# Patient Record
Sex: FEMALE | Employment: FULL TIME | URBAN - METROPOLITAN AREA
[De-identification: names, ages, dates, MRNs, and addresses within clinical notes are randomized per-mention and may not be internally consistent; named-entity substitution may affect disease eponyms.]

---

## 2020-11-01 ENCOUNTER — APPOINTMENT (OUTPATIENT)
Dept: LAB | Facility: CLINIC | Age: 35
End: 2020-11-01
Attending: OBSTETRICS & GYNECOLOGY

## 2020-11-14 ENCOUNTER — HOME INFUSION (PRE-WILLOW HOME INFUSION) (OUTPATIENT)
Dept: PHARMACY | Facility: CLINIC | Age: 35
End: 2020-11-14

## 2020-11-16 ENCOUNTER — HOME INFUSION (PRE-WILLOW HOME INFUSION) (OUTPATIENT)
Dept: PHARMACY | Facility: CLINIC | Age: 35
End: 2020-11-16

## 2020-11-16 NOTE — PROGRESS NOTES
This is a recent snapshot of the patient's [facility] medical record.  For current drug dose and complete information and questions, call [phone] or In Basket pool, fv home infusion ([id number])  CSN Number:  [id number]

## 2020-11-17 NOTE — PROGRESS NOTES
This is a recent snapshot of the patient's [facility] medical record.  For current drug dose and complete information and questions, call [phone] or In Basket pool, fv home infusion ([id number])  CSN Number:  [id number]

## 2020-11-21 ENCOUNTER — HOME INFUSION (PRE-WILLOW HOME INFUSION) (OUTPATIENT)
Dept: PHARMACY | Facility: CLINIC | Age: 35
End: 2020-11-21

## 2020-11-23 NOTE — PROGRESS NOTES
This is a recent snapshot of the patient's [facility] medical record.  For current drug dose and complete information and questions, call [phone] or In Basket pool, fv home infusion ([id number])  CSN Number:  [id number]

## 2020-11-25 ENCOUNTER — DOCUMENTATION ONLY (OUTPATIENT)
Dept: PHARMACY | Facility: CLINIC | Age: 35
End: 2020-11-25

## 2020-11-25 ENCOUNTER — HOME INFUSION (PRE-WILLOW HOME INFUSION) (OUTPATIENT)
Dept: PHARMACY | Facility: CLINIC | Age: 35
End: 2020-11-25

## 2020-11-25 NOTE — PROGRESS NOTES
Skilled Nurse visit in the [facility] Infusion Suite to administer Venofer 300mg.  No recent elevated temperature, fever, chills, productive cough, coughing for 3 weeks or longer or hemoptysis, abnormal vital signs, night sweats, chest pain. No decrease in your appetite, unexplained weight loss or fatigue.  No other new onset medical symptoms.  Current weight 248.6 pounds.  PIV placed left forearm, 1 attempt.  Infusion completed without complication or reaction. Pt reports therapy is effective in managing symptoms related to therapy.    Corazon Nix, RN

## 2020-11-30 NOTE — PROGRESS NOTES
This is a recent snapshot of the patient's [facility] medical record.  For current drug dose and complete information and questions, call [phone] or In Basket pool, fv home infusion ([id number])  CSN Number:  [id number]

## 2020-12-01 ENCOUNTER — APPOINTMENT (OUTPATIENT)
Dept: LAB | Facility: CLINIC | Age: 35
End: 2020-12-01
Attending: OBSTETRICS & GYNECOLOGY

## 2020-12-23 ENCOUNTER — HOME INFUSION (PRE-WILLOW HOME INFUSION) (OUTPATIENT)
Dept: PHARMACY | Facility: CLINIC | Age: 35
End: 2020-12-23

## 2020-12-28 NOTE — PROGRESS NOTES
This is a recent snapshot of the patient's [facility] medical record.  For current drug dose and complete information and questions, call [phone] or In Basket pool, fv home infusion ([id number])  CSN Number:  [id number]

## 2020-12-30 ENCOUNTER — DOCUMENTATION ONLY (OUTPATIENT)
Dept: PHARMACY | Facility: CLINIC | Age: 35
End: 2020-12-30

## 2020-12-30 ENCOUNTER — HOME INFUSION (PRE-WILLOW HOME INFUSION) (OUTPATIENT)
Dept: PHARMACY | Facility: CLINIC | Age: 35
End: 2020-12-30

## 2020-12-30 NOTE — PROGRESS NOTES
Skilled Nurse visit in the [facility] to administer Venofer.  No recent elevated temperature, fever, chills, productive cough, coughing for 3 weeks or longer or hemoptysis, abnormal vital signs, night sweats, chest pain. No decrease in appetite, unexplained weight loss or fatigue.  No other new onset medical symptoms.  Current weight 250.  PIV placed left ac, 1 attempt. Infusion completed without complication or reaction. Pt reports she is not sure that this is effective in managing symptoms related to therapy.    Jovan Zamora RN  [facility]  [email or web address]

## 2020-12-31 NOTE — PROGRESS NOTES
This is a recent snapshot of the patient's [facility] medical record.  For current drug dose and complete information and questions, call [phone] or In Basket pool, fv home infusion ([id number])  CSN Number:  [id number]

## 2021-01-01 ENCOUNTER — APPOINTMENT (OUTPATIENT)
Dept: LAB | Facility: CLINIC | Age: 36
End: 2021-01-01
Attending: OBSTETRICS & GYNECOLOGY

## 2021-01-04 ENCOUNTER — HOME INFUSION (PRE-WILLOW HOME INFUSION) (OUTPATIENT)
Dept: PHARMACY | Facility: CLINIC | Age: 36
End: 2021-01-04

## 2021-01-04 ENCOUNTER — DOCUMENTATION ONLY (OUTPATIENT)
Dept: PHARMACY | Facility: CLINIC | Age: 36
End: 2021-01-04

## 2021-01-04 NOTE — PROGRESS NOTES
Skilled Nurse visit in the [facility] Infusion Suite to administer Venofer 300 mg IV.  No recent elevated temperature, fever, chills, productive cough, coughing for 3 weeks or longer or hemoptysis, abnormal vital signs, night sweats, chest pain. No decrease in appetite, unexplained weight loss or fatigue.  No other new onset medical symptoms.  Current weight 250 lbs.  PIV placed left ac, 1 attempt. Infusion completed without complication or reaction. Pt reports she is not sure that this is effective in managing symptoms related to therapy.    MELVINA RodriguezN, RN  [phone]  [email or web address]

## 2021-01-05 NOTE — PROGRESS NOTES
This is a recent snapshot of the patient's [facility] medical record.  For current drug dose and complete information and questions, call [phone] or In Basket pool, fv home infusion ([id number])  CSN Number:  [id number]

## 2021-01-09 ENCOUNTER — HOME INFUSION (PRE-WILLOW HOME INFUSION) (OUTPATIENT)
Dept: PHARMACY | Facility: CLINIC | Age: 36
End: 2021-01-09

## 2021-01-11 NOTE — PROGRESS NOTES
This is a recent snapshot of the patient's [facility] medical record.  For current drug dose and complete information and questions, call [phone] or In Basket pool, fv home infusion ([id number])  CSN Number:  [id number]

## 2022-08-10 ENCOUNTER — TRANSCRIBE ORDERS (OUTPATIENT)
Dept: OTHER | Age: 37
End: 2022-08-10

## 2022-08-10 DIAGNOSIS — M25.562 ACUTE PAIN OF LEFT KNEE: ICD-10-CM

## 2022-08-10 DIAGNOSIS — M25.511 ACUTE PAIN OF RIGHT SHOULDER: Primary | ICD-10-CM

## 2024-01-25 ENCOUNTER — ANESTHESIA (OUTPATIENT)
Dept: SURGERY | Facility: CLINIC | Age: 39
DRG: 919 | End: 2024-01-25
Payer: COMMERCIAL

## 2024-01-25 ENCOUNTER — APPOINTMENT (OUTPATIENT)
Dept: ULTRASOUND IMAGING | Facility: CLINIC | Age: 39
DRG: 919 | End: 2024-01-25
Attending: EMERGENCY MEDICINE
Payer: COMMERCIAL

## 2024-01-25 ENCOUNTER — ANESTHESIA EVENT (OUTPATIENT)
Dept: SURGERY | Facility: CLINIC | Age: 39
DRG: 919 | End: 2024-01-25
Payer: COMMERCIAL

## 2024-01-25 ENCOUNTER — TRANSFERRED RECORDS (OUTPATIENT)
Dept: HEALTH INFORMATION MANAGEMENT | Facility: CLINIC | Age: 39
End: 2024-01-25

## 2024-01-25 ENCOUNTER — HOSPITAL ENCOUNTER (INPATIENT)
Facility: CLINIC | Age: 39
LOS: 2 days | Discharge: HOME | DRG: 919 | End: 2024-01-27
Attending: EMERGENCY MEDICINE | Admitting: OBSTETRICS & GYNECOLOGY
Payer: COMMERCIAL

## 2024-01-25 DIAGNOSIS — Z98.890 S/P D&C (STATUS POST DILATION AND CURETTAGE): Primary | ICD-10-CM

## 2024-01-25 DIAGNOSIS — K66.1 HEMOPERITONEUM: ICD-10-CM

## 2024-01-25 LAB
ABO/RH(D): NORMAL
ALBUMIN SERPL BCG-MCNC: 3.8 G/DL (ref 3.5–5.2)
ALP SERPL-CCNC: 56 U/L (ref 40–150)
ALT SERPL W P-5'-P-CCNC: 6 U/L (ref 0–50)
ANION GAP SERPL CALCULATED.3IONS-SCNC: 10 MMOL/L (ref 7–15)
ANTIBODY SCREEN: NEGATIVE
AST SERPL W P-5'-P-CCNC: 17 U/L (ref 0–45)
BASOPHILS # BLD AUTO: 0 10E3/UL (ref 0–0.2)
BASOPHILS NFR BLD AUTO: 0 %
BILIRUB SERPL-MCNC: 0.6 MG/DL
BLD PROD TYP BPU: NORMAL
BLD PROD TYP BPU: NORMAL
BLOOD COMPONENT TYPE: NORMAL
BLOOD COMPONENT TYPE: NORMAL
BUN SERPL-MCNC: 4 MG/DL (ref 6–20)
CALCIUM SERPL-MCNC: 9 MG/DL (ref 8.6–10)
CHLORIDE SERPL-SCNC: 104 MMOL/L (ref 98–107)
CODING SYSTEM: NORMAL
CODING SYSTEM: NORMAL
CREAT SERPL-MCNC: 0.59 MG/DL (ref 0.51–0.95)
CROSSMATCH: NORMAL
CROSSMATCH: NORMAL
DEPRECATED HCO3 PLAS-SCNC: 20 MMOL/L (ref 22–29)
EGFRCR SERPLBLD CKD-EPI 2021: >90 ML/MIN/1.73M2
EOSINOPHIL # BLD AUTO: 0.1 10E3/UL (ref 0–0.7)
EOSINOPHIL NFR BLD AUTO: 1 %
ERYTHROCYTE [DISTWIDTH] IN BLOOD BY AUTOMATED COUNT: 16.8 % (ref 10–15)
GLUCOSE SERPL-MCNC: 87 MG/DL (ref 70–99)
HCT VFR BLD AUTO: 26.2 % (ref 35–47)
HGB BLD-MCNC: 7.9 G/DL (ref 11.7–15.7)
HOLD SPECIMEN: NORMAL
HOLD SPECIMEN: NORMAL
IMM GRANULOCYTES # BLD: 0.1 10E3/UL
IMM GRANULOCYTES NFR BLD: 0 %
ISSUE DATE AND TIME: NORMAL
ISSUE DATE AND TIME: NORMAL
LYMPHOCYTES # BLD AUTO: 1.9 10E3/UL (ref 0.8–5.3)
LYMPHOCYTES NFR BLD AUTO: 15 %
MCH RBC QN AUTO: 23 PG (ref 26.5–33)
MCHC RBC AUTO-ENTMCNC: 30.2 G/DL (ref 31.5–36.5)
MCV RBC AUTO: 76 FL (ref 78–100)
MONOCYTES # BLD AUTO: 0.5 10E3/UL (ref 0–1.3)
MONOCYTES NFR BLD AUTO: 4 %
NEUTROPHILS # BLD AUTO: 10.5 10E3/UL (ref 1.6–8.3)
NEUTROPHILS NFR BLD AUTO: 80 %
NRBC # BLD AUTO: 0 10E3/UL
NRBC BLD AUTO-RTO: 0 /100
PLATELET # BLD AUTO: 256 10E3/UL (ref 150–450)
POTASSIUM SERPL-SCNC: 3.6 MMOL/L (ref 3.4–5.3)
PROT SERPL-MCNC: 7.3 G/DL (ref 6.4–8.3)
RBC # BLD AUTO: 3.44 10E6/UL (ref 3.8–5.2)
SODIUM SERPL-SCNC: 134 MMOL/L (ref 135–145)
SPECIMEN EXPIRATION DATE: NORMAL
UNIT ABO/RH: NORMAL
UNIT ABO/RH: NORMAL
UNIT NUMBER: NORMAL
UNIT NUMBER: NORMAL
UNIT STATUS: NORMAL
UNIT STATUS: NORMAL
UNIT TYPE ISBT: 7300
UNIT TYPE ISBT: 7300
WBC # BLD AUTO: 13 10E3/UL (ref 4–11)

## 2024-01-25 PROCEDURE — 250N000011 HC RX IP 250 OP 636: Performed by: NURSE ANESTHETIST, CERTIFIED REGISTERED

## 2024-01-25 PROCEDURE — 86923 COMPATIBILITY TEST ELECTRIC: CPT

## 2024-01-25 PROCEDURE — 80053 COMPREHEN METABOLIC PANEL: CPT | Performed by: EMERGENCY MEDICINE

## 2024-01-25 PROCEDURE — 250N000011 HC RX IP 250 OP 636

## 2024-01-25 PROCEDURE — 250N000024 HC ISOFLURANE, PER MIN: Performed by: OBSTETRICS & GYNECOLOGY

## 2024-01-25 PROCEDURE — 370N000017 HC ANESTHESIA TECHNICAL FEE, PER MIN: Performed by: OBSTETRICS & GYNECOLOGY

## 2024-01-25 PROCEDURE — 999N000141 HC STATISTIC PRE-PROCEDURE NURSING ASSESSMENT: Performed by: OBSTETRICS & GYNECOLOGY

## 2024-01-25 PROCEDURE — 99222 1ST HOSP IP/OBS MODERATE 55: CPT | Mod: 25 | Performed by: OBSTETRICS & GYNECOLOGY

## 2024-01-25 PROCEDURE — 99291 CRITICAL CARE FIRST HOUR: CPT | Performed by: EMERGENCY MEDICINE

## 2024-01-25 PROCEDURE — 250N000013 HC RX MED GY IP 250 OP 250 PS 637

## 2024-01-25 PROCEDURE — 0U9F4ZZ DRAINAGE OF CUL-DE-SAC, PERCUTANEOUS ENDOSCOPIC APPROACH: ICD-10-PCS | Performed by: OBSTETRICS & GYNECOLOGY

## 2024-01-25 PROCEDURE — 76856 US EXAM PELVIC COMPLETE: CPT

## 2024-01-25 PROCEDURE — 86900 BLOOD TYPING SEROLOGIC ABO: CPT | Performed by: EMERGENCY MEDICINE

## 2024-01-25 PROCEDURE — 250N000011 HC RX IP 250 OP 636: Performed by: EMERGENCY MEDICINE

## 2024-01-25 PROCEDURE — 710N000010 HC RECOVERY PHASE 1, LEVEL 2, PER MIN: Performed by: OBSTETRICS & GYNECOLOGY

## 2024-01-25 PROCEDURE — 85025 COMPLETE CBC W/AUTO DIFF WBC: CPT | Performed by: EMERGENCY MEDICINE

## 2024-01-25 PROCEDURE — 250N000009 HC RX 250: Performed by: NURSE ANESTHETIST, CERTIFIED REGISTERED

## 2024-01-25 PROCEDURE — 250N000011 HC RX IP 250 OP 636: Performed by: STUDENT IN AN ORGANIZED HEALTH CARE EDUCATION/TRAINING PROGRAM

## 2024-01-25 PROCEDURE — P9016 RBC LEUKOCYTES REDUCED: HCPCS

## 2024-01-25 PROCEDURE — 96376 TX/PRO/DX INJ SAME DRUG ADON: CPT | Performed by: EMERGENCY MEDICINE

## 2024-01-25 PROCEDURE — 258N000003 HC RX IP 258 OP 636: Performed by: NURSE ANESTHETIST, CERTIFIED REGISTERED

## 2024-01-25 PROCEDURE — 360N000076 HC SURGERY LEVEL 3, PER MIN: Performed by: OBSTETRICS & GYNECOLOGY

## 2024-01-25 PROCEDURE — 120N000002 HC R&B MED SURG/OB UMMC

## 2024-01-25 PROCEDURE — 49320 DIAG LAPARO SEPARATE PROC: CPT | Mod: GC | Performed by: OBSTETRICS & GYNECOLOGY

## 2024-01-25 PROCEDURE — 99285 EMERGENCY DEPT VISIT HI MDM: CPT | Mod: 25 | Performed by: EMERGENCY MEDICINE

## 2024-01-25 PROCEDURE — 36415 COLL VENOUS BLD VENIPUNCTURE: CPT | Performed by: EMERGENCY MEDICINE

## 2024-01-25 PROCEDURE — 96374 THER/PROPH/DIAG INJ IV PUSH: CPT | Performed by: EMERGENCY MEDICINE

## 2024-01-25 PROCEDURE — 250N000013 HC RX MED GY IP 250 OP 250 PS 637: Performed by: STUDENT IN AN ORGANIZED HEALTH CARE EDUCATION/TRAINING PROGRAM

## 2024-01-25 PROCEDURE — 272N000001 HC OR GENERAL SUPPLY STERILE: Performed by: OBSTETRICS & GYNECOLOGY

## 2024-01-25 RX ORDER — ONDANSETRON 2 MG/ML
4 INJECTION INTRAMUSCULAR; INTRAVENOUS EVERY 30 MIN PRN
Status: DISCONTINUED | OUTPATIENT
Start: 2024-01-25 | End: 2024-01-26 | Stop reason: HOSPADM

## 2024-01-25 RX ORDER — HYDROMORPHONE HYDROCHLORIDE 1 MG/ML
0.2 INJECTION, SOLUTION INTRAMUSCULAR; INTRAVENOUS; SUBCUTANEOUS EVERY 5 MIN PRN
Status: DISCONTINUED | OUTPATIENT
Start: 2024-01-25 | End: 2024-01-26 | Stop reason: HOSPADM

## 2024-01-25 RX ORDER — SODIUM CHLORIDE 9 MG/ML
INJECTION, SOLUTION INTRAVENOUS CONTINUOUS PRN
Status: DISCONTINUED | OUTPATIENT
Start: 2024-01-25 | End: 2024-01-25

## 2024-01-25 RX ORDER — SODIUM CHLORIDE, SODIUM LACTATE, POTASSIUM CHLORIDE, CALCIUM CHLORIDE 600; 310; 30; 20 MG/100ML; MG/100ML; MG/100ML; MG/100ML
INJECTION, SOLUTION INTRAVENOUS CONTINUOUS
Status: DISCONTINUED | OUTPATIENT
Start: 2024-01-25 | End: 2024-01-26 | Stop reason: HOSPADM

## 2024-01-25 RX ORDER — HALOPERIDOL 5 MG/ML
1 INJECTION INTRAMUSCULAR
Status: DISCONTINUED | OUTPATIENT
Start: 2024-01-25 | End: 2024-01-26 | Stop reason: HOSPADM

## 2024-01-25 RX ORDER — ACETAMINOPHEN 325 MG/1
975 TABLET ORAL ONCE
Status: COMPLETED | OUTPATIENT
Start: 2024-01-25 | End: 2024-01-25

## 2024-01-25 RX ORDER — ONDANSETRON 2 MG/ML
INJECTION INTRAMUSCULAR; INTRAVENOUS PRN
Status: DISCONTINUED | OUTPATIENT
Start: 2024-01-25 | End: 2024-01-25

## 2024-01-25 RX ORDER — PROPOFOL 10 MG/ML
INJECTION, EMULSION INTRAVENOUS PRN
Status: DISCONTINUED | OUTPATIENT
Start: 2024-01-25 | End: 2024-01-25

## 2024-01-25 RX ORDER — CEFAZOLIN SODIUM/WATER 2 G/20 ML
2 SYRINGE (ML) INTRAVENOUS
Status: COMPLETED | OUTPATIENT
Start: 2024-01-25 | End: 2024-01-25

## 2024-01-25 RX ORDER — OXYCODONE HYDROCHLORIDE 5 MG/1
5 TABLET ORAL ONCE
Status: COMPLETED | OUTPATIENT
Start: 2024-01-25 | End: 2024-01-25

## 2024-01-25 RX ORDER — DIMENHYDRINATE 50 MG/ML
25 INJECTION, SOLUTION INTRAMUSCULAR; INTRAVENOUS
Status: DISCONTINUED | OUTPATIENT
Start: 2024-01-25 | End: 2024-01-26 | Stop reason: HOSPADM

## 2024-01-25 RX ORDER — HYDROMORPHONE HYDROCHLORIDE 1 MG/ML
0.4 INJECTION, SOLUTION INTRAMUSCULAR; INTRAVENOUS; SUBCUTANEOUS EVERY 5 MIN PRN
Status: DISCONTINUED | OUTPATIENT
Start: 2024-01-25 | End: 2024-01-26 | Stop reason: HOSPADM

## 2024-01-25 RX ORDER — LIDOCAINE HYDROCHLORIDE 20 MG/ML
INJECTION, SOLUTION INFILTRATION; PERINEURAL PRN
Status: DISCONTINUED | OUTPATIENT
Start: 2024-01-25 | End: 2024-01-25

## 2024-01-25 RX ORDER — ACETAMINOPHEN 325 MG/1
650 TABLET ORAL EVERY 6 HOURS
Status: DISCONTINUED | OUTPATIENT
Start: 2024-01-26 | End: 2024-01-27 | Stop reason: HOSPADM

## 2024-01-25 RX ORDER — LABETALOL HYDROCHLORIDE 5 MG/ML
10 INJECTION, SOLUTION INTRAVENOUS
Status: DISCONTINUED | OUTPATIENT
Start: 2024-01-25 | End: 2024-01-26 | Stop reason: HOSPADM

## 2024-01-25 RX ORDER — ONDANSETRON 4 MG/1
4 TABLET, ORALLY DISINTEGRATING ORAL EVERY 30 MIN PRN
Status: DISCONTINUED | OUTPATIENT
Start: 2024-01-25 | End: 2024-01-26 | Stop reason: HOSPADM

## 2024-01-25 RX ORDER — CEFAZOLIN SODIUM/WATER 2 G/20 ML
2 SYRINGE (ML) INTRAVENOUS SEE ADMIN INSTRUCTIONS
Status: DISCONTINUED | OUTPATIENT
Start: 2024-01-25 | End: 2024-01-25 | Stop reason: HOSPADM

## 2024-01-25 RX ORDER — FENTANYL CITRATE 50 UG/ML
INJECTION, SOLUTION INTRAMUSCULAR; INTRAVENOUS PRN
Status: DISCONTINUED | OUTPATIENT
Start: 2024-01-25 | End: 2024-01-25

## 2024-01-25 RX ORDER — OXYCODONE HYDROCHLORIDE 5 MG/1
5 TABLET ORAL EVERY 4 HOURS PRN
Status: DISCONTINUED | OUTPATIENT
Start: 2024-01-25 | End: 2024-01-26

## 2024-01-25 RX ORDER — HYDROMORPHONE HYDROCHLORIDE 1 MG/ML
0.5 INJECTION, SOLUTION INTRAMUSCULAR; INTRAVENOUS; SUBCUTANEOUS EVERY 30 MIN PRN
Status: DISCONTINUED | OUTPATIENT
Start: 2024-01-25 | End: 2024-01-26

## 2024-01-25 RX ORDER — HYDROXYZINE HYDROCHLORIDE 25 MG/1
25 TABLET, FILM COATED ORAL EVERY 6 HOURS PRN
Status: DISCONTINUED | OUTPATIENT
Start: 2024-01-25 | End: 2024-01-25

## 2024-01-25 RX ORDER — SODIUM CHLORIDE, SODIUM LACTATE, POTASSIUM CHLORIDE, CALCIUM CHLORIDE 600; 310; 30; 20 MG/100ML; MG/100ML; MG/100ML; MG/100ML
INJECTION, SOLUTION INTRAVENOUS CONTINUOUS PRN
Status: DISCONTINUED | OUTPATIENT
Start: 2024-01-25 | End: 2024-01-25

## 2024-01-25 RX ORDER — FENTANYL CITRATE 50 UG/ML
50 INJECTION, SOLUTION INTRAMUSCULAR; INTRAVENOUS EVERY 5 MIN PRN
Status: DISCONTINUED | OUTPATIENT
Start: 2024-01-25 | End: 2024-01-26 | Stop reason: HOSPADM

## 2024-01-25 RX ORDER — HYDRALAZINE HYDROCHLORIDE 20 MG/ML
2.5-5 INJECTION INTRAMUSCULAR; INTRAVENOUS EVERY 10 MIN PRN
Status: DISCONTINUED | OUTPATIENT
Start: 2024-01-25 | End: 2024-01-26 | Stop reason: HOSPADM

## 2024-01-25 RX ORDER — KETAMINE HYDROCHLORIDE 10 MG/ML
INJECTION INTRAMUSCULAR; INTRAVENOUS PRN
Status: DISCONTINUED | OUTPATIENT
Start: 2024-01-25 | End: 2024-01-25

## 2024-01-25 RX ORDER — FENTANYL CITRATE 50 UG/ML
25 INJECTION, SOLUTION INTRAMUSCULAR; INTRAVENOUS EVERY 5 MIN PRN
Status: DISCONTINUED | OUTPATIENT
Start: 2024-01-25 | End: 2024-01-26 | Stop reason: HOSPADM

## 2024-01-25 RX ORDER — MEPERIDINE HYDROCHLORIDE 25 MG/ML
12.5 INJECTION INTRAMUSCULAR; INTRAVENOUS; SUBCUTANEOUS EVERY 5 MIN PRN
Status: DISCONTINUED | OUTPATIENT
Start: 2024-01-25 | End: 2024-01-26 | Stop reason: HOSPADM

## 2024-01-25 RX ADMIN — PHENYLEPHRINE HYDROCHLORIDE 100 MCG: 10 INJECTION INTRAVENOUS at 21:08

## 2024-01-25 RX ADMIN — HYDROMORPHONE HYDROCHLORIDE 0.5 MG: 1 INJECTION, SOLUTION INTRAMUSCULAR; INTRAVENOUS; SUBCUTANEOUS at 17:19

## 2024-01-25 RX ADMIN — LIDOCAINE HYDROCHLORIDE 100 MG: 20 INJECTION, SOLUTION INFILTRATION; PERINEURAL at 20:55

## 2024-01-25 RX ADMIN — SODIUM CHLORIDE, POTASSIUM CHLORIDE, SODIUM LACTATE AND CALCIUM CHLORIDE: 600; 310; 30; 20 INJECTION, SOLUTION INTRAVENOUS at 22:09

## 2024-01-25 RX ADMIN — HYDROMORPHONE HYDROCHLORIDE 0.5 MG: 1 INJECTION, SOLUTION INTRAMUSCULAR; INTRAVENOUS; SUBCUTANEOUS at 19:44

## 2024-01-25 RX ADMIN — SODIUM CHLORIDE, POTASSIUM CHLORIDE, SODIUM LACTATE AND CALCIUM CHLORIDE: 600; 310; 30; 20 INJECTION, SOLUTION INTRAVENOUS at 20:53

## 2024-01-25 RX ADMIN — SUCCINYLCHOLINE CHLORIDE 100 MG: 20 INJECTION, SOLUTION INTRAMUSCULAR; INTRAVENOUS; PARENTERAL at 20:56

## 2024-01-25 RX ADMIN — ONDANSETRON 4 MG: 2 INJECTION INTRAMUSCULAR; INTRAVENOUS at 22:23

## 2024-01-25 RX ADMIN — HYDROXYZINE HYDROCHLORIDE 25 MG: 25 TABLET, FILM COATED ORAL at 23:23

## 2024-01-25 RX ADMIN — FENTANYL CITRATE 50 MCG: 50 INJECTION INTRAMUSCULAR; INTRAVENOUS at 23:03

## 2024-01-25 RX ADMIN — Medication 30 MG: at 20:52

## 2024-01-25 RX ADMIN — SODIUM CHLORIDE: 9 INJECTION, SOLUTION INTRAVENOUS at 20:48

## 2024-01-25 RX ADMIN — Medication 2 G: at 20:56

## 2024-01-25 RX ADMIN — Medication 20 MG: at 21:17

## 2024-01-25 RX ADMIN — FENTANYL CITRATE 100 MCG: 50 INJECTION INTRAMUSCULAR; INTRAVENOUS at 20:55

## 2024-01-25 RX ADMIN — SUGAMMADEX 200 MG: 100 INJECTION, SOLUTION INTRAVENOUS at 22:30

## 2024-01-25 RX ADMIN — ACETAMINOPHEN 975 MG: 325 TABLET, FILM COATED ORAL at 19:52

## 2024-01-25 RX ADMIN — OXYCODONE HYDROCHLORIDE 5 MG: 5 TABLET ORAL at 23:23

## 2024-01-25 RX ADMIN — Medication 10 MG: at 22:23

## 2024-01-25 RX ADMIN — MIDAZOLAM 2 MG: 1 INJECTION INTRAMUSCULAR; INTRAVENOUS at 20:52

## 2024-01-25 RX ADMIN — PHENYLEPHRINE HYDROCHLORIDE 0.2 MCG/KG/MIN: 10 INJECTION INTRAVENOUS at 21:15

## 2024-01-25 RX ADMIN — PROPOFOL 200 MG: 10 INJECTION, EMULSION INTRAVENOUS at 20:55

## 2024-01-25 RX ADMIN — Medication 30 MG: at 21:34

## 2024-01-25 ASSESSMENT — ACTIVITIES OF DAILY LIVING (ADL)
ADLS_ACUITY_SCORE: 35

## 2024-01-25 ASSESSMENT — LIFESTYLE VARIABLES: TOBACCO_USE: 0

## 2024-01-25 NOTE — ED PROVIDER NOTES
[facility] ([facility])    24      ED PROVIDER NOTE   History     Chief Complaint   Patient presents with    Anemia     Patient had d & C today at 1145 and Hgb fell from 8.0 pre-procedure to 6.8 post    Vaginal Bleeding     Reports [facility] but are concerned for uterine perforation; MD could not locate a perforation on ultrasound post procedure; pt has not soaked a regular pad yet     HPI  Rosalinda Jay is a 38 year old female with prior history of anemia,  x 2 who presents with acute blood loss.  She underwent D&C today at 11:45 AM complicated by vaginal bleeding.  They were concerned for uterine perforation based on the bleeding, were not able to locate source of perforation on ultrasound.  Her preprocedure hemoglobin was 8 but had decreased to 6.8 postprocedure. She was sent here for further evaluation and OB/GYN consult.  Notes decreased bleeding here.  She is having worsening abdominal pain.  No other symptoms noted.      PAST MEDICAL HISTORY:   Past Medical History:   Diagnosis Date    Anemia        PAST SURGICAL HISTORY: History reviewed. No pertinent surgical history.    Past medical history, past surgical history, medications, and allergies were reviewed with the patient. Additional pertinent items: None    FAMILY HISTORY: History reviewed. No pertinent family history.    SOCIAL HISTORY:   Social History     Tobacco Use    Smoking status: Never    Smokeless tobacco: Never   Substance Use Topics    Alcohol use: Not Currently     Social history was reviewed with the patient. Additional pertinent items: None    Patient's Medications    No medications on file          Allergies   Allergen Reactions    Ciprofloxacin Hives     Pt started cipro and tramadol at the same time, unsure which medication she reacted to    Ketorolac Tromethamine Hives    Amoxicillin-Pot Clavulanate Nausea and Vomiting    Vancomycin Unknown     Seafood Itching and Rash     Sudden on set after eating shrimp    Shrimp Itching and Rash     Sudden on set after eating shrimp       A complete review of systems was performed with pertinent positives and negatives noted in the HPI, and all other systems negative.    Physical Exam   BP: 122/67  Pulse: 88  Temp: 98.2  F (36.8  C)  Resp: 16  SpO2: 99 %      Physical Exam  Vitals and nursing note reviewed.   Constitutional:       General: She is in acute distress.      Appearance: She is well-developed. She is not diaphoretic.   HENT:      Head: Normocephalic and atraumatic.      Mouth/Throat:      Pharynx: No oropharyngeal exudate.   Eyes:      General: No scleral icterus.        Right eye: No discharge.         Left eye: No discharge.      Pupils: Pupils are equal, round, and reactive to light.   Cardiovascular:      Rate and Rhythm: Normal rate and regular rhythm.      Heart sounds: Normal heart sounds. No murmur heard.     No friction rub. No gallop.   Pulmonary:      Effort: Pulmonary effort is normal. No respiratory distress.      Breath sounds: Normal breath sounds. No wheezing.   Chest:      Chest wall: No tenderness.   Abdominal:      General: Bowel sounds are normal. There is no distension.      Palpations: Abdomen is soft.      Tenderness: There is abdominal tenderness.   Musculoskeletal:         General: No tenderness or deformity. Normal range of motion.      Cervical back: Normal range of motion and neck supple.   Skin:     General: Skin is warm and dry.      Coloration: Skin is not pale.      Findings: No erythema or rash.   Neurological:      Mental Status: She is alert and oriented to person, place, and time.      Cranial Nerves: No cranial nerve deficit.         ED Course        Procedures                      Labs Ordered and Resulted from Time of ED Arrival to Time of ED Departure - No data to display         Critical Care time was 30 minutes for this patient excluding procedures.      Assessments  & Plan (with Medical Decision Making)   Is a 38-year-old female who presents with decreased hemoglobin and concern for uterine perforation.  She underwent D&C today and was sent to this facility due to drop in hemoglobin and concern for perforation.  On exam patient appears uncomfortable.  She has lower abdominal tenderness that has been increasing during her stay in the Emergency Department.  Work shows hemoglobin of 7.9 with WBC count of 13.  Ultrasound shows concern for blood within the pelvis.  I discussed case with OB/GYN who saw the patient.  They took patient to the OR.    I have reviewed the nursing notes.    I have reviewed the findings, diagnosis, plan and need for follow up with the patient.    New Prescriptions    No medications on file       Final diagnoses:   None     Rowdy Gutierrez DO    1/25/2024   [facility] EMERGENCY DEPARTMENT         Rowdy Gutierrez, DO  01/26/24 0050

## 2024-01-25 NOTE — CONSULTS
Gynecology Consultation Note      Patient Summary:  Rosalinda Jay is a 38 year old  who is POD#0 s/p sD&C at 6w sent to the ED from [facility] to be evaluated for persistent pain and chills after extended monitoring.    HPI: Ms. Jay had an presumed uncomplicated sD&C this morning, but stayed for prolonged monitoring in the setting of persistent pain after the procedure was complete. Her bleeding was within expected limits and there was no obvious etiology for her pain on ultrasound in the clinic so she was sent for further evaluation. On presentation to the ED she reported ongoing pain which was initially controlled with dilaudid. She went for pelvic US and was unable to tolerate vaginal probe during the US and on return to the room reported worsening pain.     On my evaluation after pt returned from pelvic US, she reports pain which has been worsening since her arrival. Started down low in her abdomen initially after the procedure but has migrated to her upper abdomen and radiates around to her flanks. Any type of movement on the bed is extremely uncomfortable. She feels lightheaded and dizzy even lying in bed at the moment. Also starting to feel nauseated but has not vomited. Last at some crackers and water after her procedure around noon. Denies chest pain or shortness of breath. Reports medical history is only significant for anemia. She is not taking any daily medications. Surgical history is notable for 3  deliveries.     ROS: Negative aside from above    OB History:   C/s x3, term deliveries, most recently in     PMH:   Past Medical History:   Diagnosis Date    Anemia        PSHx:    section x 3    Medications:   Current Facility-Administered Medications   Medication    ceFAZolin Sodium (ANCEF) injection 2 g    ceFAZolin Sodium (ANCEF) injection 2 g    [Auto Hold] HYDROmorphone (PF) (DILAUDID) injection 0.5 mg     No current facility-administered medications on file prior to  encounter.  No current outpatient medications on file prior to encounter.      Allergies:     Allergies   Allergen Reactions    Ciprofloxacin Hives     Pt started cipro and tramadol at the same time, unsure which medication she reacted to    Ketorolac Tromethamine Hives    Amoxicillin-Pot Clavulanate Nausea and Vomiting    Vancomycin Unknown    Ibuprofen GI Disturbance    Seafood Itching and Rash     Sudden on set after eating shrimp    Shrimp Itching and Rash     Sudden on set after eating shrimp       Social History:   Social History     Socioeconomic History    Marital status: Single     Spouse name: None    Number of children: None    Years of education: None    Highest education level: None   Tobacco Use    Smoking status: Never    Smokeless tobacco: Never   Substance and Sexual Activity    Alcohol use: Not Currently    Drug use: Never    Sexual activity: Yes       Physical Exam:   /60   Pulse 96   Temp 98.4  F (36.9  C)   Resp 16   SpO2 100%   Constitutional: Writhing in bed, appears uncomfortable, intermittently moaning   Cardiovascular: borderline tachycardic, well perfused   Respiratory: NWOB  Abdomen: soft, diffusely tender to palpation with guarding throughout, no rebound   Pelvic Exam - External genitalia normal well-estrogenized, healthy tissue. Normal pink vaginal mucosa.  - SSE: Normal cervix with minimal bleeding from external os. Old blood in vaginal vault, no evidence of cervical or vaginal laceration     Imaging + Labs: Reviewed. CBC notable for Hgb 7.9, was 8.6 prior to procedure. Otherwise wnl.    Pelvic US 1/25/2024:  IMPRESSION:  1.  Moderate volume of free pelvic fluid, some of which appears complex. Blood products cannot be excluded and correlation with CT angiography is recommended. This should include noncontrast, arterial, and venous phase imaging. Findings discussed   verbally via telephone with Dr. Gutierrez at 6:20 PM on 01/25/2024.  2.  No gross evidence of uterine perforation,  within limitation of the transabdominal only technique. If clinical suspicion persists, consider increased measures to control patient's pain and reattempt transvaginal imaging.  3.  Nonvisualization of both ovaries.      A&P: Rosalinda Jay is a 38 year old who is POD#0 from a sD&C for termination of pregnancy at 6w here to have her persistent pain evaluated. On arrival to the ED her pain initially improved with dilaudid but has subsequently worsened. Her vaginal bleeding is minimal, hemoglobin is stable however pelvic US shows a moderate amount of free fluid in the pelvis c/f possible blood product. Given worsening discomfort, exam with peritoneal signs, tachycardia, and imaging with free fluid which was not seen initially following her procedure, concern for possible uterine perforation or intraabdominal injury/bleeding. Recommended EUA, diagnostic laproscopy, possible exploratory laparotomy and pt agreed to proceed. Consented for blood transfusion in the case of ongoing bleeding. Verbally consented for hysterectomy in the setting of uncontrolled bleeding originating from the uterus or uterine vessels, possible involvement of other surgical teams in the setting of identified injury to another intraabdominal organ. Pt NPO since 1200. T&S, CBC previously collected.   - to OR for above stated procedure     Patient seen and discussed with Amor.    Clara Thomas MD  OB/GYN PGY-2  1/25/2024 7:47 PM     Staff MD Note    I appreciate the note by Dr. Thomas.  Any necessary changes have been made by me.  I saw and evaluated the patient and agree with the findings and plan of care as documented in the note.  Patient with concerns for free fluid in pelvic on Pelvic US and worsening pain following sD&C despite treatment with IV narcotics.  Patient with guarding throughout abdomen on palpation.  Discussed recommendation for diagnostic laparoscopy, possible exploratory laparotomy, possible uterine surgery.  Discussed risks of  bleeding, infection, damage to surrounding structures including bladder, bowels, ureters, uterus, ovaries, fallopian tubes, muscles, nerves and vessels in abdominal wall.  Given her anemia also discussed potential need for blood transfusion and she agrees to this if needed.  Consents signed to reflect above.  Plan to head to OR emergently.  Anesthesia and OR notified of plan.    Diana Chinchilla MD

## 2024-01-25 NOTE — ED NOTES
Bed: ED05  Expected date:   Expected time:   Means of arrival:   Comments:  Ambulance Vag bleed with low Hgb

## 2024-01-25 NOTE — ED TRIAGE NOTES
Patient received Tylenol and zofran at the clinic. EMS gave total of 100 mcg of fentanyl IV with last dose of 50 mcg given at 1550.

## 2024-01-25 NOTE — LETTER
[facility]  [address] [address]  Phone: [phone]  Fax: [phone]    January 27, 2024        Rosalinda Jay  [address]  [place] [address]          To whom it may concern:    RE: Rosalinda Jay    Patient may return to work on 2/12 with the following:  No restrictions    Please contact me for questions or concerns.      Sincerely,      Jovan Perez MD  Obstetrics & Gynecology, PGY-1  01/27/2024 10:33 AM

## 2024-01-26 LAB
ERYTHROCYTE [DISTWIDTH] IN BLOOD BY AUTOMATED COUNT: 16.8 % (ref 10–15)
HCG INTACT+B SERPL-ACNC: 8668 MIU/ML
HCT VFR BLD AUTO: 27.6 % (ref 35–47)
HGB BLD-MCNC: 8.3 G/DL (ref 11.7–15.7)
MCH RBC QN AUTO: 23.2 PG (ref 26.5–33)
MCHC RBC AUTO-ENTMCNC: 30.1 G/DL (ref 31.5–36.5)
MCV RBC AUTO: 77 FL (ref 78–100)
PLATELET # BLD AUTO: 251 10E3/UL (ref 150–450)
RBC # BLD AUTO: 3.58 10E6/UL (ref 3.8–5.2)
WBC # BLD AUTO: 7.6 10E3/UL (ref 4–11)

## 2024-01-26 PROCEDURE — 250N000011 HC RX IP 250 OP 636

## 2024-01-26 PROCEDURE — 120N000002 HC R&B MED SURG/OB UMMC

## 2024-01-26 PROCEDURE — 84702 CHORIONIC GONADOTROPIN TEST: CPT | Performed by: OBSTETRICS & GYNECOLOGY

## 2024-01-26 PROCEDURE — 85027 COMPLETE CBC AUTOMATED: CPT

## 2024-01-26 PROCEDURE — 258N000003 HC RX IP 258 OP 636

## 2024-01-26 PROCEDURE — 36415 COLL VENOUS BLD VENIPUNCTURE: CPT | Performed by: OBSTETRICS & GYNECOLOGY

## 2024-01-26 PROCEDURE — 250N000013 HC RX MED GY IP 250 OP 250 PS 637

## 2024-01-26 RX ORDER — PROCHLORPERAZINE MALEATE 10 MG
10 TABLET ORAL EVERY 6 HOURS PRN
Status: DISCONTINUED | OUTPATIENT
Start: 2024-01-26 | End: 2024-01-27 | Stop reason: HOSPADM

## 2024-01-26 RX ORDER — NALOXONE HYDROCHLORIDE 0.4 MG/ML
0.2 INJECTION, SOLUTION INTRAMUSCULAR; INTRAVENOUS; SUBCUTANEOUS
Status: DISCONTINUED | OUTPATIENT
Start: 2024-01-26 | End: 2024-01-27 | Stop reason: HOSPADM

## 2024-01-26 RX ORDER — HYDROMORPHONE HCL IN WATER/PF 6 MG/30 ML
.2-.4 PATIENT CONTROLLED ANALGESIA SYRINGE INTRAVENOUS
Status: DISCONTINUED | OUTPATIENT
Start: 2024-01-26 | End: 2024-01-26

## 2024-01-26 RX ORDER — PROCHLORPERAZINE 25 MG
25 SUPPOSITORY, RECTAL RECTAL EVERY 12 HOURS PRN
Status: DISCONTINUED | OUTPATIENT
Start: 2024-01-26 | End: 2024-01-27 | Stop reason: HOSPADM

## 2024-01-26 RX ORDER — POLYETHYLENE GLYCOL 3350 17 G/17G
17 POWDER, FOR SOLUTION ORAL 2 TIMES DAILY PRN
Status: DISCONTINUED | OUTPATIENT
Start: 2024-01-26 | End: 2024-01-27 | Stop reason: HOSPADM

## 2024-01-26 RX ORDER — NALOXONE HYDROCHLORIDE 0.4 MG/ML
0.4 INJECTION, SOLUTION INTRAMUSCULAR; INTRAVENOUS; SUBCUTANEOUS
Status: DISCONTINUED | OUTPATIENT
Start: 2024-01-26 | End: 2024-01-27 | Stop reason: HOSPADM

## 2024-01-26 RX ORDER — IBUPROFEN 600 MG/1
600 TABLET, FILM COATED ORAL EVERY 6 HOURS
Status: DISCONTINUED | OUTPATIENT
Start: 2024-01-26 | End: 2024-01-26

## 2024-01-26 RX ORDER — ONDANSETRON 2 MG/ML
4 INJECTION INTRAMUSCULAR; INTRAVENOUS EVERY 6 HOURS PRN
Status: DISCONTINUED | OUTPATIENT
Start: 2024-01-26 | End: 2024-01-27 | Stop reason: HOSPADM

## 2024-01-26 RX ORDER — CYCLOBENZAPRINE HCL 5 MG
10 TABLET ORAL 3 TIMES DAILY
Status: DISCONTINUED | OUTPATIENT
Start: 2024-01-26 | End: 2024-01-27 | Stop reason: HOSPADM

## 2024-01-26 RX ORDER — OXYCODONE HYDROCHLORIDE 5 MG/1
5-10 TABLET ORAL EVERY 4 HOURS PRN
Status: DISCONTINUED | OUTPATIENT
Start: 2024-01-26 | End: 2024-01-27 | Stop reason: HOSPADM

## 2024-01-26 RX ORDER — AMOXICILLIN 250 MG
2 CAPSULE ORAL 2 TIMES DAILY
Status: DISCONTINUED | OUTPATIENT
Start: 2024-01-26 | End: 2024-01-27 | Stop reason: HOSPADM

## 2024-01-26 RX ORDER — AMOXICILLIN 250 MG
1 CAPSULE ORAL 2 TIMES DAILY
Status: DISCONTINUED | OUTPATIENT
Start: 2024-01-26 | End: 2024-01-27 | Stop reason: HOSPADM

## 2024-01-26 RX ORDER — SODIUM CHLORIDE, SODIUM LACTATE, POTASSIUM CHLORIDE, CALCIUM CHLORIDE 600; 310; 30; 20 MG/100ML; MG/100ML; MG/100ML; MG/100ML
INJECTION, SOLUTION INTRAVENOUS CONTINUOUS
Status: DISCONTINUED | OUTPATIENT
Start: 2024-01-26 | End: 2024-01-26

## 2024-01-26 RX ORDER — IBUPROFEN 600 MG/1
600 TABLET, FILM COATED ORAL EVERY 6 HOURS PRN
Status: DISCONTINUED | OUTPATIENT
Start: 2024-01-26 | End: 2024-01-26

## 2024-01-26 RX ORDER — LIDOCAINE 40 MG/G
CREAM TOPICAL
Status: DISCONTINUED | OUTPATIENT
Start: 2024-01-26 | End: 2024-01-27 | Stop reason: HOSPADM

## 2024-01-26 RX ORDER — ONDANSETRON 4 MG/1
4 TABLET, ORALLY DISINTEGRATING ORAL EVERY 6 HOURS PRN
Status: DISCONTINUED | OUTPATIENT
Start: 2024-01-26 | End: 2024-01-27 | Stop reason: HOSPADM

## 2024-01-26 RX ADMIN — SODIUM CHLORIDE, POTASSIUM CHLORIDE, SODIUM LACTATE AND CALCIUM CHLORIDE: 600; 310; 30; 20 INJECTION, SOLUTION INTRAVENOUS at 06:12

## 2024-01-26 RX ADMIN — ACETAMINOPHEN 650 MG: 325 TABLET, FILM COATED ORAL at 11:51

## 2024-01-26 RX ADMIN — OXYCODONE HYDROCHLORIDE 10 MG: 5 TABLET ORAL at 06:09

## 2024-01-26 RX ADMIN — CYCLOBENZAPRINE HYDROCHLORIDE 10 MG: 5 TABLET, FILM COATED ORAL at 13:31

## 2024-01-26 RX ADMIN — CYCLOBENZAPRINE HYDROCHLORIDE 10 MG: 5 TABLET, FILM COATED ORAL at 20:38

## 2024-01-26 RX ADMIN — CYCLOBENZAPRINE HYDROCHLORIDE 10 MG: 5 TABLET, FILM COATED ORAL at 07:55

## 2024-01-26 RX ADMIN — SENNOSIDES AND DOCUSATE SODIUM 1 TABLET: 8.6; 5 TABLET ORAL at 07:55

## 2024-01-26 RX ADMIN — OXYCODONE HYDROCHLORIDE 5 MG: 5 TABLET ORAL at 20:38

## 2024-01-26 RX ADMIN — SODIUM CHLORIDE, POTASSIUM CHLORIDE, SODIUM LACTATE AND CALCIUM CHLORIDE: 600; 310; 30; 20 INJECTION, SOLUTION INTRAVENOUS at 01:32

## 2024-01-26 RX ADMIN — ACETAMINOPHEN 650 MG: 325 TABLET, FILM COATED ORAL at 01:32

## 2024-01-26 RX ADMIN — SENNOSIDES AND DOCUSATE SODIUM 1 TABLET: 8.6; 5 TABLET ORAL at 20:38

## 2024-01-26 RX ADMIN — CYCLOBENZAPRINE HYDROCHLORIDE 10 MG: 5 TABLET, FILM COATED ORAL at 02:55

## 2024-01-26 RX ADMIN — HYDROMORPHONE HYDROCHLORIDE 0.4 MG: 0.2 INJECTION, SOLUTION INTRAMUSCULAR; INTRAVENOUS; SUBCUTANEOUS at 02:54

## 2024-01-26 RX ADMIN — Medication 5 MG: at 01:32

## 2024-01-26 RX ADMIN — ACETAMINOPHEN 650 MG: 325 TABLET, FILM COATED ORAL at 06:10

## 2024-01-26 RX ADMIN — ACETAMINOPHEN 650 MG: 325 TABLET, FILM COATED ORAL at 18:32

## 2024-01-26 ASSESSMENT — ACTIVITIES OF DAILY LIVING (ADL)
ADLS_ACUITY_SCORE: 20
ADLS_ACUITY_SCORE: 35
ADLS_ACUITY_SCORE: 35
ADLS_ACUITY_SCORE: 20
ADLS_ACUITY_SCORE: 20
ADLS_ACUITY_SCORE: 18
ADLS_ACUITY_SCORE: 20
ADLS_ACUITY_SCORE: 18
ADLS_ACUITY_SCORE: 35
ADLS_ACUITY_SCORE: 20

## 2024-01-26 NOTE — PLAN OF CARE
Problem: Adult Inpatient Plan of Care  Goal: Absence of Hospital-Acquired Illness or Injury  Outcome: Progressing  Intervention: Identify and Manage Fall Risk  Recent Flowsheet Documentation  Taken 1/26/2024 0519 by Álvaro Chau RN  Safety Promotion/Fall Prevention: activity supervised  Intervention: Prevent Skin Injury  Recent Flowsheet Documentation  Taken 1/26/2024 0519 by Álvaro Chau RN  Body Position: supine, head elevated  Intervention: Prevent and Manage VTE (Venous Thromboembolism) Risk  Recent Flowsheet Documentation  Taken 1/26/2024 0519 by Álvaro Chau RN  VTE Prevention/Management: SCDs (sequential compression devices) on  Intervention: Prevent Infection  Recent Flowsheet Documentation  Taken 1/26/2024 0519 by Álvaro Chau RN  Infection Prevention: rest/sleep promoted  Goal: Optimal Comfort and Wellbeing  Outcome: Progressing  Intervention: Monitor Pain and Promote Comfort  Recent Flowsheet Documentation  Taken 1/26/2024 0610 by Álvaro Chau RN  Pain Management Interventions: medication (see MAR)  Taken 1/26/2024 0500 by Álvaro Chau RN  Pain Management Interventions: medication (see MAR)  Taken 1/26/2024 0333 by Álvaro Chau RN  Pain Management Interventions: medication (see MAR)  Taken 1/26/2024 0309 by Álvaro Chau RN  Pain Management Interventions: medication (see MAR)  Taken 1/26/2024 0217 by Álvaro Chau RN  Pain Management Interventions: medication (see MAR)  Goal: Readiness for Transition of Care  Outcome: Progressing  Flowsheets (Taken 1/26/2024 0715)  Anticipated Changes Related to Illness: none  Concerns to be Addressed: discharge planning  Intervention: Mutually Develop Transition Plan  Recent Flowsheet Documentation  Taken 1/26/2024 0715 by Álvaro Chau RN  Anticipated Changes Related to Illness: none  Concerns to be Addressed: discharge planning  Taken 1/26/2024 0517 by Álvaro Chau RN  Equipment Currently Used  at Home: none     Problem: Pain Acute  Goal: Optimal Pain Control and Function  Outcome: Progressing  Intervention: Develop Pain Management Plan  Recent Flowsheet Documentation  Taken 1/26/2024 0610 by Álvaro Chau RN  Pain Management Interventions: medication (see MAR)  Taken 1/26/2024 0500 by Álvaro Chau RN  Pain Management Interventions: medication (see MAR)  Taken 1/26/2024 0333 by Álvaro Chau RN  Pain Management Interventions: medication (see MAR)  Taken 1/26/2024 0309 by Álvaro Chau RN  Pain Management Interventions: medication (see MAR)  Taken 1/26/2024 0217 by Álvaro Chau RN  Pain Management Interventions: medication (see MAR)     Problem: Fall Injury Risk  Goal: Absence of Fall and Fall-Related Injury  Outcome: Met  Intervention: Promote Injury-Free Environment  Recent Flowsheet Documentation  Taken 1/26/2024 0519 by Álvaro Chau RN  Safety Promotion/Fall Prevention: activity supervised   Goal Outcome Evaluation:        New admit to the unit. Pt. Appeared to be disoriented due to the anaesthesia when she first arrived on the unit. Pt. Was disoriented to place and situation. Towards the end of the night, Pt. Appeared to be more orientated. Pt. Ax1/ SBA to BSC. Pt. Has some spotting, nothing abnormal. Possible discharge today  pending CBC. Continue POC

## 2024-01-26 NOTE — ANESTHESIA POSTPROCEDURE EVALUATION
Patient: Rosalinda Jay    Procedure: Procedure(s):  Exam Under Anesthesia, Laparoscopy diagnostic (gyn)       Anesthesia Type:  General    Note:  Disposition: Admission   Postop Pain Control: Uneventful            Sign Out: Well controlled pain   PONV: No   Neuro/Psych: Uneventful            Sign Out: Acceptable/Baseline neuro status   Airway/Respiratory: Uneventful            Sign Out: Acceptable/Baseline resp. status   CV/Hemodynamics: Uneventful            Sign Out: Acceptable CV status; No obvious hypovolemia; No obvious fluid overload   Other NRE: NONE   DID A NON-ROUTINE EVENT OCCUR? No           Last vitals:  Vitals Value Taken Time   /63 01/25/24 2315   Temp 37.1  C (98.8  F) 01/25/24 2245   Pulse 92 01/25/24 2323   Resp 19 01/25/24 2323   SpO2 99 % 01/25/24 2323   Vitals shown include unfiled device data.    Electronically Signed By: Janel Diaz MD  January 25, 2024  11:24 PM

## 2024-01-26 NOTE — PROGRESS NOTES
Gynecology Progress Note    Subjective:  Pt transferring from commode to bed. Endorses ongoing pain, but thinks it overall feels a little better than prior to surgery. Also endorses some upper abdominal irritation. Denies dizziness, nausea/vomiting. Voiding. Has light vaginal bleeding.     Objective:  Vitals:    01/25/24 2345 01/26/24 0008 01/26/24 0500 01/26/24 0610   BP:  108/53  106/58   BP Location:  Right arm  Left arm   Patient Position:  Semi-Nickerson's  Semi-Nickerson's   Cuff Size:  Adult Large  Adult Large   Pulse:    91   Resp:  16  17   Temp:  98.5  F (36.9  C)  98.4  F (36.9  C)   TempSrc:  Oral  Oral   SpO2: 97% 99% 98% 98%   Weight:  110.8 kg (244 lb 4.3 oz)     Height:         General: NAD, tired  CV: Regular rate  Pulm: Normal respiratory effort.  Abd: Soft, non-distended, appropriately tender. Incisions x3 with bandages in place, scant breakthrough drainage in umbilical incision, otherwise C/D/I  Ext: Trace edema    Labs:  Hgb 8.2 > EBL 5 > 7.9 > EBL 5 > 8.3    bHCG 8668    Assessment/Plan:  Rosalinda Jay is a 38 year old female who is POD#1 s/p EUA and diagnostic laparoscopy for abdominal pain after suction D&C for pregnancy termination. Surgery demonstrated small amount bloody fluid in pelvis but otherwise unremarkable without evidence of active bleeding or injury. bHCG obtained today as above, however no immediate pre-op value for comparison; will continue to trend to ensure resolving. Stable post-operatively, continues to work on pain control.    # Abdominal pain  # Post op  Routine post-operative goals: Encourage ambulation and spirometry.  FEN: regular diet  Pain: continue oral medications, plan to discontinue IV meds today  Heme: Hgb stable over last several checks  : Voiding  GI: Tolerating PO intake. Continue anti-emetics and stool softeners as needed.  PPX: SCDs while in bed, encourage ambulation  Dispo: Discharge to home likely POD#2 pending pain control    Sapna Bradford MD  ObGyn,  PGY-4  01/26/2024 7:21 AM

## 2024-01-26 NOTE — PROGRESS NOTES
ADMISSION to [facility] UNIT:    Rosalinda Jay was admitted from Post op  for  Hemoperitoneum .  2 RN skin assessment: completed by Aissatou SPENCE   Result of skin assessment and interventions/actions:  3 small surgical incisions covered with primapore  Height, weight: completed? UTC pt. Came up to unit very lethargic   Patient belongings & admission documents: see Flowsheets, completed? Pt. Belongings with her in room.   MDRO education added to care plan: yes

## 2024-01-26 NOTE — ANESTHESIA PROCEDURE NOTES
Airway       Patient location during procedure: OR       Procedure Start/Stop Times: 1/25/2024 8:57 PM  Staff -        Anesthesiologist:  Janel Rice MD       CRNA: Mckenna Morse APRN CRNA       Performed By: CRNA  Consent for Airway        Urgency: elective  Indications and Patient Condition       Indications for airway management: el-procedural       Induction type:intravenous       Mask difficulty assessment: 1 - vent by mask    Final Airway Details       Final airway type: endotracheal airway       Successful airway: ETT - single and Oral  Endotracheal Airway Details        ETT size (mm): 7.0       Cuffed: yes       Successful intubation technique: video laryngoscopy       VL Blade Size: MAC 3       Grade View of Cords: 1       Adjucts: stylet       Position: Right       Measured from: gums/teeth       Secured at (cm): 21       Bite block used: None    Post intubation assessment        Placement verified by: capnometry, equal breath sounds and chest rise        Number of attempts at approach: 1       Secured with: tape       Ease of procedure: easy       Dentition: Intact and Unchanged    Medication(s) Administered   Medication Administration Time: 1/25/2024 8:57 PM

## 2024-01-26 NOTE — OP NOTE
[facility]  Gynecology Operative Note     Name: Rosalinda Jay  MRN: [id number]  : 1985     Date of Surgery: 24     Pre-operative Diagnosis:   - Abdominal pain POD#0 suction D&C  - Moderate volume of free pelvic fluid     Post-operative Diagnosis:   - Same s/p below stated procedure      Procedure: Exam under anesthesia, Diagnostic laparoscopy    Surgeon: Dr. Diana Chinchilla MD  Assistants: Ernestina Bolton MD PGY-3 & Kaleigh Thomas MD PGY-2    Anesthesia: GETA    EBL: 5 mL   IVF: 1500 mL crystalloid  UOP: 375 mL clear yellow urine     Complications: None apparent     Indications:  Rosalinda Jay is a 38 year old who is POD#0 from a sD&C for termination of pregnancy at 6w here to have her persistent pain evaluated. On arrival to the ED her pain initially improved with dilaudid but has subsequently worsened. Her vaginal bleeding is minimal, hemoglobin is stable however pelvic US shows a moderate amount of free fluid in the pelvis c/f possible blood product. Given worsening discomfort, exam with peritoneal signs, tachycardia, and imaging with free fluid which was not seen initially following her procedure, concern for possible uterine perforation or intraabdominal injury/bleeding. Recommended EUA, diagnostic laproscopy, possible exploratory laparotomy and pt agreed to proceed. Consented for blood transfusion in the case of ongoing bleeding. Verbally consented for hysterectomy in the setting of uncontrolled bleeding originating from the uterus or uterine vessels, possible involvement of other surgical teams in the setting of identified injury to another intraabdominal organ      Findings: Normal external female genitalia. Normal vaginal mucosa and cervix without evidence of cervical or vaginal laceration. No evidence of intraabdominal injury on laparoscopic entry. No evidence of bleeding from entry site or lower quadrant port sites. Normal upper abdominal survey. Enlarged, globular  uterus without evidence of perforation. Normal appearing fallopian tubes and ovaries bilaterally without evidence of active bleeding (possible corpus luteum within right ovary, hemostatic in appearance). Bladder adherent to lower uterine segment. Adhesions noted from left lateral uterus to left pelvic sidewall. Small amount (100 ml) of bloody free fluid in the pelvis without evidence of ongoing collection. Normal appendix adherent to right abdominal sidewall.      Specimen: None     Complications: None apparent     Procedure Details:   The patient was taken to the operating room where SCDs were placed for DVT prophylaxis. General anesthesia was administered. The patient was placed in the dorsal lithotomy position with her feet in Yellowfin stirrups and arms tucked at her sides. She was prepped and draped in the usual sterile fashion. A surgical time out was performed.     A myers catheter was placed in the bladder. A speculum was placed in the vagina to visualize there cervix. The anterior lip of the cervix was grasped with a single-toothed tenaculum, and an acorn uterine manipulator was placed.      Attention was turned to the abdomen. A 5 mm vertical incision within the umbilicus. The umbilicus was then elevated and the Veress needle was placed through the umbilical incision to establish pneumoperitoneum. A saline drop test suggested intraperitoneal placement. CO2 gas was attached and opening pressure was less than 5 mmHg. The abdomen was insufflated to 15 mmHg. The Veress needle was removed and a 5 mm bariatric Visiport was placed under direct visualization.    Survey of the abdomen and pelvis revealed no injury on entry and the findings noted above. The patient was then placed in Trendelenburg position. Left and right lower quadrant 5 mm bariatric ports were placed, taking care to avoid the inferior epigastric vasculature bilaterally.  An atraumatic grasper was used to antevert the uterus and elevate the  bilateral adnexa, facilitating a thorough pelvic survey with above noted findings that were ultimately not concerning for perforation. Approximately 100 ml of bloody free fluid was evacuated from the posterior cul-de-sac using the suction-, without evidence of ongoing collection.     The pneumoperitoneum was expelled and the laparoscopic ports removed. The skin incisions were closed using 4-0 Monocryl. The tenaculum and acorn uterine manipulator were removed uneventfully and the cervix appeared hemostatic on final inspection. The myers catheter was removed.      All sponge, instrument, and needle counts were correct times two. Dr. Chinchilla was scrubbed and present for the procedure.The patient was extubated in the operating room and transferred to the PACU in stable condition.    Ernestina Bolton MD, PGY-3  12:05 AM  [facility]    Staff MD Note  I was present and scrubbed for the entire procedure noted above.  I agree with the description above and any necessary changes have been made by me.  Diana Chinchilla MD

## 2024-01-26 NOTE — PHARMACY-ADMISSION MEDICATION HISTORY
Pharmacist Admission Medication History    Admission medication history is complete. The information provided in this note is only as accurate as the sources available at the time of the update.    Information Source(s): Patient via in-person    Pertinent Information: None     Changes made to PTA medication list:  Added: None  Deleted: None  Changed: None    Medication Affordability:       Allergies reviewed with patient and updates made in EHR: yes    Medication History Completed By: JENNIFER TURNER RPH 1/26/2024 2:44 PM    No outpatient medications have been marked as taking for the 1/25/24 encounter (Hospital Encounter).

## 2024-01-26 NOTE — OR NURSING
"PACU to Inpatient Nursing Handoff    Patient Rosalinda Jay is a 38 year old female who speaks English.   Procedure Procedure(s):  Exam Under Anesthesia, Laparoscopy diagnostic (gyn)   Surgeon(s) Primary: Diana Chinchilla MD  Resident - Assisting: Clara Thomas MD; Ernestina Bolton MD     Allergies   Allergen Reactions    Ciprofloxacin Hives     Pt started cipro and tramadol at the same time, unsure which medication she reacted to    Ketorolac Tromethamine Hives    Amoxicillin-Pot Clavulanate Nausea and Vomiting    Vancomycin Unknown    Ibuprofen GI Disturbance    Seafood Itching and Rash     Sudden on set after eating shrimp    Shrimp Itching and Rash     Sudden on set after eating shrimp       Isolation  No active isolations     Past Medical History   has a past medical history of Anemia.    Anesthesia General   Dermatome Level     Preop Meds acetaminophen (Tylenol) - time given: 1952   Nerve block Not applicable   Intraop Meds fentanyl (Sublimaze): 100 mcg total  ketamine (Ketalar): 30 mg given  ondansetron (Zofran): last given at 2223   Local Meds Yes   Antibiotics cefazolin (Ancef) - last given at 2056     Pain Patient Currently in Pain: yes   PACU meds  hydroxizine (Vistaril/Atarax): 25 mg (total dose) last given at 2325   oxycodone (Roxicodone): 5 mg (total dose) last given at 2325    PCA / epidural No   Capnography     Telemetry     Inpatient Telemetry Monitor Ordered? No        Labs Glucose Lab Results   Component Value Date    GLC 87 01/25/2024       Hgb Lab Results   Component Value Date    HGB 7.9 01/25/2024       INR No results found for: \"INR\"   PACU Imaging Not applicable     Wound/Incision Incision/Surgical Site 01/25/24 Umbilicus (Active)   Number of days: 0       Incision/Surgical Site 01/25/24 Left Abdomen (Active)   Number of days: 0       Incision/Surgical Site 01/25/24 Right Abdomen (Active)   Number of days: 0      CMS        Equipment Not applicable   Other LDA ETT Cuffed 7 mm (Active)   Number " of days: 0        IV Access Peripheral IV Left Antecubital fossa (Active)   Site Assessment WDL 01/25/24 1645   Line Status Saline locked 01/25/24 1645   Dressing Status clean;dry;intact 01/25/24 1645   Line Intervention Flushed;Lab drawn 01/25/24 1645   Phlebitis Scale 0-->no symptoms 01/25/24 1645   Infiltration? no 01/25/24 1645   Number of days:        Peripheral IV 01/25/24 Right Wrist (Active)   Number of days: 0      Blood Products Not applicable EBL 10 mL   Intake/Output Date 01/25/24 0700 - 01/26/24 0659   Shift 6478-8183 4359-1269 9829-0950 24 Hour Total   INTAKE   I.V.  1400  1400   Shift Total(mL/kg)  1400(13.13)  1400(13.13)   OUTPUT   Shift Total(mL/kg)       Weight (kg)  106.59 106.59 106.59      Drains / [place] Urethral Catheter 01/25/24 Double-lumen 16 fr (Active)   Number of days: 0      Time of void PreOp Time of Void Prior to Procedure: 1930 (01/25/24 2010)    PostOp      Diapered? No   Bladder Scan     PO    crackers, water, and apple juice     Vitals    B/P: 117/60  T: 98.4  F (36.9  C)    Temp src: Oral  P:  Pulse: 96 (01/25/24 1700)          R: 16  O2:  SpO2: 100 %                   Family/support present No one here at this time, updated cousin, Ann by phone   Patient belongings     Patient transported on cart   DC meds/scripts (obs/outpt) Not applicable   Inpatient Pain Meds Released? Yes       Special needs/considerations None   Tasks needing completion None       Lilia Schulz, RN  [facility], or [phone]

## 2024-01-26 NOTE — ANESTHESIA CARE TRANSFER NOTE
Patient: Rosalinda Jay    Procedure: Procedure(s):  Exam Under Anesthesia, Laparoscopy diagnostic (gyn)       Diagnosis: Uterine perforation [S37.69XA]  Diagnosis Additional Information: No value filed.    Anesthesia Type:   General     Note:    Oropharynx: oropharynx clear of all foreign objects and spontaneously breathing  Level of Consciousness: drowsy  Oxygen Supplementation: face mask    Independent Airway: airway patency satisfactory and stable  Dentition: dentition unchanged  Vital Signs Stable: post-procedure vital signs reviewed and stable    Patient transferred to: PACU    Handoff Report: Identifed the Patient, Identified the Reponsible Provider, Reviewed the pertinent medical history, Discussed the surgical course, Reviewed Intra-OP anesthesia mangement and issues during anesthesia, Set expectations for post-procedure period and Allowed opportunity for questions and acknowledgement of understanding      Vitals:  Vitals Value Taken Time   /63 01/25/24 2315   Temp 37.1  C (98.8  F) 01/25/24 2245   Pulse 93 01/25/24 2322   Resp 15 01/25/24 2322   SpO2 98 % 01/25/24 2322   Vitals shown include unfiled device data.    Electronically Signed By: Janel Diaz MD  January 25, 2024  11:24 PM

## 2024-01-26 NOTE — ANESTHESIA PREPROCEDURE EVALUATION
Anesthesia Pre-Procedure Evaluation    Patient: Rosalinda Jay   MRN: [id number] : 1985        Procedure : Procedure(s):  Laparoscopy diagnostic (gyn)  Laparotomy exploratory, Possible Uterine Sugery          Past Medical History:   Diagnosis Date    Anemia       History reviewed. No pertinent surgical history.   Allergies   Allergen Reactions    Ciprofloxacin Hives     Pt started cipro and tramadol at the same time, unsure which medication she reacted to    Ketorolac Tromethamine Hives    Amoxicillin-Pot Clavulanate Nausea and Vomiting    Vancomycin Unknown    Ibuprofen GI Disturbance    Seafood Itching and Rash     Sudden on set after eating shrimp    Shrimp Itching and Rash     Sudden on set after eating shrimp      Social History     Tobacco Use    Smoking status: Never    Smokeless tobacco: Never   Substance Use Topics    Alcohol use: Not Currently      Wt Readings from Last 1 Encounters:   24 106.6 kg (235 lb)        Anesthesia Evaluation   Pt has had prior anesthetic.         ROS/MED HX  ENT/Pulmonary:  - neg pulmonary ROS  (-) tobacco use and asthma   Neurologic:  - neg neurologic ROS     Cardiovascular:       METS/Exercise Tolerance:     Hematologic:     (+)      anemia,          Musculoskeletal: Comment: Low back pain without sciatica      GI/Hepatic:       Renal/Genitourinary:  - neg Renal ROS     Endo:     (+)               Obesity,       Psychiatric/Substance Use:       Infectious Disease:  - neg infectious disease ROS     Malignancy:       Other: Comment: S/P D&C for pregnancy termination at ~5wks.   -- complicated with symptomatic hypotension and severe abdominal pain, concerning for possible uterine perforation  -- Here for diagnostic laparoscopy  -- Hgb 7.9 on admission   Prior pregnancies x3, all delivered via  under spinal. Uncomplicated           Physical Exam    Airway  airway exam normal      Mallampati: II   TM distance: > 3 FB   Neck ROM: full   Mouth opening: > 3  "cm    Respiratory Devices and Support         Dental       (+) Modest Abnormalities - crowns, retainers, 1 or 2 missing teeth      Cardiovascular   cardiovascular exam normal          Pulmonary   pulmonary exam normal                OUTSIDE LABS:  CBC:   Lab Results   Component Value Date    WBC 13.0 (H) 01/25/2024    HGB 7.9 (L) 01/25/2024    HCT 26.2 (L) 01/25/2024     01/25/2024     BMP:   Lab Results   Component Value Date     (L) 01/25/2024    POTASSIUM 3.6 01/25/2024    CHLORIDE 104 01/25/2024    CO2 20 (L) 01/25/2024    BUN 4.0 (L) 01/25/2024    CR 0.59 01/25/2024    GLC 87 01/25/2024     COAGS: No results found for: \"PTT\", \"INR\", \"FIBR\"  POC:   Lab Results   Component Value Date    HCG Negative 06/18/2003     HEPATIC:   Lab Results   Component Value Date    ALBUMIN 3.8 01/25/2024    PROTTOTAL 7.3 01/25/2024    ALT 6 01/25/2024    AST 17 01/25/2024    ALKPHOS 56 01/25/2024    BILITOTAL 0.6 01/25/2024     OTHER:   Lab Results   Component Value Date    ISA 9.0 01/25/2024       Anesthesia Plan    ASA Status:  3, emergent    NPO Status:  ELEVATED Aspiration Risk/Unknown    Anesthesia Type: General.     - Airway: ETT   Induction: Intravenous, RSI.   Maintenance: Balanced.   Techniques and Equipment:     - Airway: Video-Laryngoscope     - Lines/Monitors: 2nd IV     - Blood: Blood in Room, PRBC, FFP, T&S, T&C     - Drips/Meds: Phenylephrine     Consents    Anesthesia Plan(s) and associated risks, benefits, and realistic alternatives discussed. Questions answered and patient/representative(s) expressed understanding.     - Discussed:     - Discussed with:  Patient      - Extended Intubation/Ventilatory Support Discussed: Yes.      - Patient is DNR/DNI Status: No     Use of blood products discussed: Yes.     - Discussed with: Patient.     - Consented: consented to blood products     Postoperative Care    Pain management: Oral pain medications, IV analgesics.   PONV prophylaxis: Ondansetron (or other " "5HT-3), Dexamethasone or Solumedrol     Comments:    Other Comments: Emergent case for likely hemoperitoneum inpatient who had a D&C at other institution./ Suspected perforated uterus.   Starting Hgb 7.9. Will have blood available in the OR  Additional considerations as stated above.   MD Janel Horan MD    I have reviewed the pertinent notes and labs in the chart from the past 30 days and (re)examined the patient.  Any updates or changes from those notes are reflected in this note.     # Hyponatremia: Lowest Na = 134 mmol/L in last 30 days, will monitor as appropriate          # Obesity: Estimated body mass index is 39.11 kg/m  as calculated from the following:    Height as of this encounter: 1.651 m (5' 5\").    Weight as of this encounter: 106.6 kg (235 lb).      "

## 2024-01-26 NOTE — BRIEF OP NOTE
[facility]    Brief Operative Note    Pre-operative diagnosis: Abdominal pain s/p dilation and curettage     Intraabdominal free fluid c/f possible uterine perforation   Post-operative diagnosis Same as pre-operative diagnosis    Procedure: Exam Under Anesthesia, Laparoscopy diagnostic (gyn), N/A - Abdomen    Surgeon: Surgeon(s) and Role:     * Diana Chinchilla MD - Primary     * Clara Thomas MD - Resident - Assisting     * Ernestina Bolton MD - Resident - Assisting  Anesthesia: General   Estimated Blood Loss: 5 mL from 1/25/2024  8:48 PM to 1/25/2024 10:39 PM  IVF: 1500 ml crystalloid   UOP: 375 ml clear yellow urine       Drains: None  Specimens: * No specimens in log *  Complications: None     Findings: Normal external female genitalia. Normal vaginal mucosa and cervix without evidence of cervical or vaginal laceration. No evidence of intraabdominal injury on laparoscopic entry. No evidence of bleeding from entry site or lower quadrant port sites. Normal upper abdominal survey. Enlarged, globular uterus without evidence of perforation. Normal appearing fallopian tubes and ovaries bilaterally without evidence of active bleeding. Bladder adherent to lower uterine segment. Adhesions noted from left lateral uterus to left pelvic sidewall. Small amount (100 ml) of bloody free fluid in the pelvis without evidence of ongoing collection. Normal appendix adherent to right abdominal sidewall.     Full operative report to follow.     Clara Thomas MD  OB/GYN PGY-2  1/25/2024 10:46 PM

## 2024-01-27 VITALS
DIASTOLIC BLOOD PRESSURE: 46 MMHG | TEMPERATURE: 99.6 F | SYSTOLIC BLOOD PRESSURE: 111 MMHG | HEART RATE: 76 BPM | OXYGEN SATURATION: 98 % | HEIGHT: 65 IN | BODY MASS INDEX: 40.7 KG/M2 | RESPIRATION RATE: 18 BRPM | WEIGHT: 244.27 LBS

## 2024-01-27 PROCEDURE — 250N000013 HC RX MED GY IP 250 OP 250 PS 637

## 2024-01-27 RX ORDER — CYCLOBENZAPRINE HCL 10 MG
10 TABLET ORAL 3 TIMES DAILY PRN
Qty: 30 TABLET | Refills: 1 | Status: SHIPPED | OUTPATIENT
Start: 2024-01-27

## 2024-01-27 RX ORDER — OXYCODONE HYDROCHLORIDE 5 MG/1
5 TABLET ORAL EVERY 6 HOURS PRN
Qty: 12 TABLET | Refills: 0 | Status: SHIPPED | OUTPATIENT
Start: 2024-01-27 | End: 2024-01-30

## 2024-01-27 RX ORDER — ACETAMINOPHEN 325 MG/1
650 TABLET ORAL EVERY 6 HOURS
Qty: 120 TABLET | Refills: 0 | Status: SHIPPED | OUTPATIENT
Start: 2024-01-27

## 2024-01-27 RX ORDER — POLYETHYLENE GLYCOL 3350 17 G/17G
17 POWDER, FOR SOLUTION ORAL DAILY
Qty: 510 G | Refills: 0 | Status: SHIPPED | OUTPATIENT
Start: 2024-01-27

## 2024-01-27 RX ADMIN — DOCUSATE SODIUM 50 MG AND SENNOSIDES 8.6 MG 2 TABLET: 8.6; 5 TABLET, FILM COATED ORAL at 09:08

## 2024-01-27 RX ADMIN — ACETAMINOPHEN 650 MG: 325 TABLET, FILM COATED ORAL at 06:22

## 2024-01-27 RX ADMIN — CYCLOBENZAPRINE HYDROCHLORIDE 10 MG: 5 TABLET, FILM COATED ORAL at 09:08

## 2024-01-27 RX ADMIN — ACETAMINOPHEN 650 MG: 325 TABLET, FILM COATED ORAL at 00:15

## 2024-01-27 RX ADMIN — OXYCODONE HYDROCHLORIDE 5 MG: 5 TABLET ORAL at 09:08

## 2024-01-27 RX ADMIN — ACETAMINOPHEN 650 MG: 325 TABLET, FILM COATED ORAL at 12:04

## 2024-01-27 ASSESSMENT — ACTIVITIES OF DAILY LIVING (ADL)
ADLS_ACUITY_SCORE: 20
ADLS_ACUITY_SCORE: 25
ADLS_ACUITY_SCORE: 25

## 2024-01-27 NOTE — PROGRESS NOTES
Pt. discharged at 12:42 pm to home, and left with personal belongings. Pt. received complete discharge paperwork and discharge medications as filled by discharge pharmacy. Pt received and signed for the narcotic medication oxycodone. Pt. was given times of last dose for all discharge medications in writing on discharge medication sheets. Discharge teaching included oxycodone and constipation medication, pain management, activity restrictions, dressing changes, and signs and symptoms of infection.  Pt. to follow up with Dr. Gomez in the next week. Pt. had no further questions at the time of discharge and no unmet needs were identified.     Temp: 99.6  F (37.6  C) Temp src: Oral BP: 111/46 Pulse: 76   Resp: 18 SpO2: 98 % O2 Device: None (Room air)

## 2024-01-27 NOTE — DISCHARGE SUMMARY
Gynecology Discharge Summary    Rosalinda Jay    YOB: 1985  MRN# [id number]   Age: 38 year old         Date of Admission:  1/25/2024  Date of Discharge:  1/27/2024  Admitting Physician:  Diana Chinchilla MD  Discharge Physician:  Giselle Chapin MD  Discharging Service:  Gynecology     Primary Provider: Maci Willson          Admission Diagnoses:   S/p suction D&C at outside site  Vaginal bleeding  Abdominal pain  Free pelvic fluid          Discharge Diagnosis:     Same             Discharge Disposition:     Discharged to home in stable condition           Procedures:   Exam under anesthesia, Diagnostic laparoscopy           Medications Prior to Admission:     No medications prior to admission.             Discharge Medications:        Review of your medicines        START taking        Dose / Directions   acetaminophen 325 MG tablet  Commonly known as: TYLENOL  Used for: S/P D&C (status post dilation and curettage)      Dose: 650 mg  Take 2 tablets (650 mg) by mouth every 6 hours  Quantity: 120 tablet  Refills: 0     cyclobenzaprine 10 MG tablet  Commonly known as: FLEXERIL      Dose: 10 mg  Take 1 tablet (10 mg) by mouth 3 times daily as needed for muscle spasms  Quantity: 30 tablet  Refills: 1     oxyCODONE 5 MG tablet  Commonly known as: ROXICODONE      Dose: 5 mg  Take 1 tablet (5 mg) by mouth every 6 hours as needed for pain  Quantity: 12 tablet  Refills: 0     polyethylene glycol 17 GM/Dose powder  Commonly known as: MIRALAX  Used for: S/P D&C (status post dilation and curettage)      Dose: 17 g  Take 17 g by mouth daily  Quantity: 510 g  Refills: 0               Where to get your medicines        These medications were sent to [facility] [address]  [address] [address]      Phone: [phone]   acetaminophen 325 MG tablet  cyclobenzaprine 10 MG tablet  oxyCODONE 5 MG tablet  polyethylene glycol 17 GM/Dose powder                Consultations:     none             Brief History of Illness:   Ms. Jay had an presumed uncomplicated sD&C this morning, but stayed for prolonged monitoring in the setting of persistent pain after the procedure was complete. Her bleeding was within expected limits and there was no obvious etiology for her pain on ultrasound in the clinic so she was sent for further evaluation. On presentation to the ED she reported ongoing pain which was initially controlled with dilaudid. She went for pelvic US and was unable to tolerate vaginal probe during the US and on return to the room reported worsening pain.      On my evaluation after pt returned from pelvic US, she reports pain which has been worsening since her arrival. Started down low in her abdomen initially after the procedure but has migrated to her upper abdomen and radiates around to her flanks. Any type of movement on the bed is extremely uncomfortable. She feels lightheaded and dizzy even lying in bed at the moment. Also starting to feel nauseated but has not vomited. Last at some crackers and water after her procedure around noon. Denies chest pain or shortness of breath. Reports medical history is only significant for anemia. She is not taking any daily medications. Surgical history is notable for 3  deliveries.     Given worsening discomfort, exam with peritoneal signs, tachycardia, and imaging with free fluid which was not seen initially following her procedure, concern for possible uterine perforation or intraabdominal injury/bleeding. Recommended EUA, diagnostic laproscopy, possible exploratory laparotomy and pt agreed to proceed. Consented for blood transfusion in the case of ongoing bleeding. Verbally consented for hysterectomy in the setting of uncontrolled bleeding originating from the uterus or uterine vessels, possible involvement of other surgical teams in the setting of identified injury to another intraabdominal organ             Hospital Course:     Her postoperative course was uncomplicated. On POD#2, she was meeting all of her postoperative goals and deemed stable for discharge. She was voiding without difficulty, tolerating a regular diet without nausea and vomiting, her pain was well controlled on oral pain medicines and her lochia was appropriate.                Discharge Instructions and Follow-Up:     Discharge diet: Regular   Discharge activity: Activity as tolerated   Discharge follow-up: Our clinic scheduler will call you to set up a lab and an appointment in the next week   Other instructions: none     Jovan Perez MD  Obstetrics & Gynecology, PGY-1  01/27/2024 12:05 PM     I have seen, examined, and counseled the patient on the day of discharge. I have reviewed and edited the summary.  Giselle Chapin

## 2024-01-27 NOTE — PROGRESS NOTES
Gynecology Progress Note    Subjective:  Patient is resting comfortably in bed. Reports that pain is still present but is overall much improved from prior. Also endorses some upper abdominal irritation (reviewed that this is likely from insufflation with laparoscopy). Denies dizziness, nausea/vomiting. Voiding. Has light vaginal bleeding.     Objective:  Vitals:    01/26/24 0610 01/26/24 0724 01/26/24 1551 01/26/24 2236   BP: 106/58 109/58 118/53 134/50   BP Location: Left arm Right arm Right arm Left arm   Patient Position: Semi-Nickerson's   Semi-Nickerson's   Cuff Size: Adult Large   Adult Regular   Pulse: 91 82 90 86   Resp: 17 16 17 18   Temp: 98.4  F (36.9  C) 98.8  F (37.1  C) 98.2  F (36.8  C) 97.8  F (36.6  C)   TempSrc: Oral Oral Oral Oral   SpO2: 98% 98% 99% 100%   Weight:       Height:         General: NAD, tired  CV: Regular rate  Pulm: Normal respiratory effort.  Abd: Soft, non-distended, minimally tender over the area of the uterus, otherwise non-tender.  Inc: lsc port sites x3 with bandages in place, scant breakthrough drainage in umbilical incision, otherwise C/D/I  Ext: Trace edema    Labs:  Hgb 8.2 > EBL 5 > 7.9 > EBL 5 > 8.3    [facility] 8668 (0657 1/26)        Assessment/Plan:  Rosalinda Jay is a 38 year old female who is POD#2 s/p EUA and diagnostic laparoscopy for abdominal pain after suction D&C for pregnancy termination. Surgery demonstrated small amount bloody fluid in pelvis but otherwise unremarkable without evidence of active bleeding or injury. [facility] obtained POD#1 as above, however no immediate pre-op value for comparison; will continue to trend as indicated to ensure resolving. Stable post-operatively, continues to work on pain control.    # Abdominal pain  # Post op  Routine post-operative goals: Encourage ambulation and spirometry.  FEN: regular diet  Pain: continue oral medications, (discontinued IV meds POD#1)  Heme: Hgb stable over last several checks  : Voiding  GI: Tolerating PO intake.  Continue anti-emetics and stool softeners as needed.  PPX: SCDs while in bed, encourage ambulation  Dispo: Discharge to home likely POD#2-3 pending pain control    Ernestina Bolton MD, PGY-3  8:24 AM  [facility]    I have seen and examined the patient without the resident. I have reviewed, edited, and agree with the note.   My findings are: appears well, ordering lunch. States ready to go home and pain is much better and currently the worst part is sore throat.     Giselle Chapin MD

## 2024-01-27 NOTE — PLAN OF CARE
"  Problem: Adult Inpatient Plan of Care  Goal: Patient-Specific Goal (Individualized)  Description: You can add care plan individualizations to a care plan. Examples of Individualization might be:  \"Parent requests to be called daily at 9am for status\", \"I have a hard time hearing out of my right ear\", or \"Do not touch me to wake me up as it startles  me\".  Outcome: Progressing  Goal: Absence of Hospital-Acquired Illness or Injury  Outcome: Progressing  Intervention: Identify and Manage Fall Risk  Recent Flowsheet Documentation  Taken 1/27/2024 0044 by Álvaro Chau RN  Safety Promotion/Fall Prevention: activity supervised  Intervention: Prevent Skin Injury  Recent Flowsheet Documentation  Taken 1/27/2024 0044 by Álvaro Chau RN  Body Position: position changed independently  Intervention: Prevent Infection  Recent Flowsheet Documentation  Taken 1/27/2024 0044 by Álvaro Chau RN  Infection Prevention: rest/sleep promoted  Goal: Optimal Comfort and Wellbeing  Outcome: Progressing  Intervention: Monitor Pain and Promote Comfort  Recent Flowsheet Documentation  Taken 1/27/2024 0044 by Álvaro Chau RN  Pain Management Interventions: medication (see MAR)  Goal: Readiness for Transition of Care  Outcome: Progressing  Flowsheets (Taken 1/27/2024 0538)  Anticipated Changes Related to Illness: none  Transportation Anticipated: family or friend will provide  Concerns to be Addressed: discharge planning  Intervention: Mutually Develop Transition Plan  Recent Flowsheet Documentation  Taken 1/27/2024 0538 by Álvaro Chau RN  Anticipated Changes Related to Illness: none  Transportation Anticipated: family or friend will provide  Concerns to be Addressed: discharge planning     Problem: Pain Acute  Goal: Optimal Pain Control and Function  Outcome: Progressing  Intervention: Develop Pain Management Plan  Recent Flowsheet Documentation  Taken 1/27/2024 0044 by Álvaro Chau RN  Pain Management " "Interventions: medication (see MAR)     Problem: Fall Injury Risk  Goal: Absence of Fall and Fall-Related Injury  Intervention: Promote Injury-Free Environment  Recent Flowsheet Documentation  Taken 1/27/2024 0044 by Álvaro Chau RN  Safety Promotion/Fall Prevention: activity supervised   Goal Outcome Evaluation:        No acute changes this shift. Pt. Was offered pain medication during the night but refused stated that \"I just want to try and get some sleep first\" pt. Only took scheduled tylenol. Pt. Slept for majority of the shift. Possible discharge today. Continue POC.                   "

## 2024-01-27 NOTE — PLAN OF CARE
Goal Outcome Evaluation:      Plan of Care Reviewed With: patient    Overall Patient Progress: improvingOverall Patient Progress: improving       Rosalinda Jay is a 38 year old female who is here for Postop Laparotomy.    NURSING ASSESSMENT:  Neuro: A&Ox4  Respiratory: VSS on RA   Cardiac: WNL  GI/: Urinating in bedside commode. Light Vaginal Spotting post procedure.   Activity/Musculoskeletal: SBA  Skin: Intact ex: 3 lap sites CDI   Psychosocial: WNL     Changes this shift: Pain improvement, Off LR infusion, increase in Pt appetite. Increase in strength     PAIN MANAGEMENT:   Managed with Scheduled Tylenol and Flexeril     NURSING PLAN:  Continue. Follow POC.    DISCHARGE DISPOSITION:  Estimated discharge date: Tomorrow. Pt to go HOME     Jess Olivarez, RN    Pt able to make needs known, call light within reach

## 2024-01-29 ENCOUNTER — TELEPHONE (OUTPATIENT)
Dept: OBGYN | Facility: CLINIC | Age: 39
End: 2024-01-29
Payer: COMMERCIAL

## 2024-01-29 NOTE — TELEPHONE ENCOUNTER
Dr. Gomez called and wanted us to reach out to the patient to let her know that Dr. Gomez would like her to have a Bhcg drawn today to compare it to the one on 1-26-24.    I did call the patient, but received her VM. I left a detailed message stating that Dr. Gomez would like her to go today to have another Bhcg drawn and she can either come to our outpatient lab which does not require an appointment or she can go to any [place] lab, but will need to call ahead to make an appointment. I also let the patient know to call us and let us know that she received the message at [phone].

## 2024-01-29 NOTE — LETTER
February 8, 2024    Rosalinda Jay  [address]  [place] [address]      Dear Rosalinda Jay,    We have been attempting to call you regarding the need for follow up lab monitoring after your surgery on 01/25/2024. Trending these lab values is important to your health and safety. The risk of an ongoing ectopic pregnancy if present are significant and include death. Further labs will help determine if there is an ectopic pregnancy or not.     You may call the clinic at [phone] and request to speak to member of your care team if you have questions or concerns.    Sincerely,    Rebecca HE RN sent on behalf of Dr. Samia De La Fuente

## 2024-01-29 NOTE — LETTER
February 8, 2024    Rosalinda Jay  [address]  [place] [facility] [address]      Dear Rosalinda Jay,    We have been attempting to call you regarding the need for follow up lab monitoring after your surgery on 01/25/2024. Trending these lab values is important to your health and safety. The risk of an ongoing ectopic pregnancy, if present, are significant and include death. Further labs will help determine if there is an ectopic pregnancy or not.     You may call the clinic at [phone] and request to speak to member of your care team if you have questions or concerns.    Sincerely,    Rebecca HE RN sent on behalf of Dr. Samia De La Fuente

## 2024-01-30 ENCOUNTER — PATIENT OUTREACH (OUTPATIENT)
Dept: CARE COORDINATION | Facility: CLINIC | Age: 39
End: 2024-01-30
Payer: COMMERCIAL

## 2024-01-30 NOTE — PROGRESS NOTES
Connected Care Resource Center Contact  [facility]/Voicemail     Clinical Data: Post-Discharge Outreach     Outreach attempted x 2.  Left message on patient's voicemail, providing [facility]'s central phone number of [phone] ([phone]) for questions/concerns and/or to schedule an appt with an [facility] provider, if they do not have a PCP.      Plan:  [facility] will do no further outreaches at this time.       ZENOBIA Perry  [facility], [facility]    *Connected Care Resource Team does NOT follow patient ongoing. Referrals are identified based on internal discharge reports and the outreach is to ensure patient has an understanding of their discharge instructions.

## 2024-02-05 NOTE — TELEPHONE ENCOUNTER
Shared Beta HCG list reviewed. Patient has not had repeat beta drawn despite voicemail that was left on 01/29. Attempted to call patient and left details voice message encouraging she have this lab drawn.

## 2024-02-06 NOTE — TELEPHONE ENCOUNTER
Tried to reach Rosalinda but received voicemail.  Left message to call back regarding follow up lab recommendations.

## 2024-02-07 NOTE — TELEPHONE ENCOUNTER
Tried to reach Rosalinda but received voicemail.  Left message to call back regarding follow up lab work.

## 2024-02-08 NOTE — TELEPHONE ENCOUNTER
Shared beta HCG list reviewed. Clinic has reached out to patient x4 times with no return in phone call. Routed to on-call MD.

## 2024-02-16 ENCOUNTER — TELEPHONE (OUTPATIENT)
Dept: OBGYN | Facility: CLINIC | Age: 39
End: 2024-02-16
Payer: COMMERCIAL

## 2024-02-16 DIAGNOSIS — Z32.01 PREGNANCY TEST POSITIVE: ICD-10-CM

## 2024-02-16 DIAGNOSIS — K66.1 HEMOPERITONEUM: Primary | ICD-10-CM

## 2024-02-16 NOTE — TELEPHONE ENCOUNTER
Called Rosalinda, as she did not have BHCG drawn yesterday as scheduled.  She states that she will go have it drawn today.  Reinforced the importance of this follow up.  Pt indicated understanding and agreed with plan.

## 2024-02-19 NOTE — TELEPHONE ENCOUNTER
Tried to reach Rosalinda but received voicemail.  Left message that we are still awaiting lab results, and encouraged to have lab drawn ASAP

## 2024-02-20 ENCOUNTER — LAB (OUTPATIENT)
Dept: LAB | Facility: CLINIC | Age: 39
End: 2024-02-20
Payer: COMMERCIAL

## 2024-02-20 DIAGNOSIS — Z98.890 S/P D&C (STATUS POST DILATION AND CURETTAGE): ICD-10-CM

## 2024-02-20 PROCEDURE — 84702 CHORIONIC GONADOTROPIN TEST: CPT

## 2024-02-20 PROCEDURE — 36415 COLL VENOUS BLD VENIPUNCTURE: CPT

## 2024-02-21 LAB — HCG INTACT+B SERPL-ACNC: 7 MIU/ML

## 2024-02-26 NOTE — TELEPHONE ENCOUNTER
Called Rosalinda to let her know to draw another bHCG this week or next to make sure it is truly negative. She will get it drawn later this week, did not specify a day.

## 2024-02-27 NOTE — TELEPHONE ENCOUNTER
Beta HCG list reviewed 02/27. Patient was reached out to yesterday and plans to have beta HCG drawn between 02/27 and 03/05 - lab order placed under on-call MD.

## 2024-03-06 NOTE — TELEPHONE ENCOUNTER
Reviewed shared beta list - Rosalinda did not get her last beta drawn to between 2/27 and 3/5 as discussed.    Called Rosalinda to ask she get her last beta drawn and she stated that she was unsure if she still had to, as she just started her period 3 days ago.    Routing to provider for next steps.

## 2024-03-06 NOTE — TELEPHONE ENCOUNTER
Called Rosalinda w/ no answer. LVM letting her know that since she got her menstrual period there is no need to have that last bHCG drawn. Gave her our callback number for any questions. Removed pt from CG list.

## 2025-08-05 ENCOUNTER — TELEPHONE (OUTPATIENT)
Dept: OBGYN | Facility: CLINIC | Age: 40
End: 2025-08-05
Payer: COMMERCIAL

## 2025-08-05 DIAGNOSIS — Z33.2 TERMINATION OF PREGNANCY (FETUS): Primary | ICD-10-CM

## 2025-08-05 DIAGNOSIS — Z41.9 ELECTIVE SURGERY: Primary | ICD-10-CM

## 2025-08-05 RX ORDER — ONDANSETRON 2 MG/ML
4 INJECTION INTRAMUSCULAR; INTRAVENOUS ONCE
OUTPATIENT
Start: 2025-08-05

## 2025-08-05 RX ORDER — DOXYCYCLINE HYCLATE 50 MG/1
200 CAPSULE ORAL ONCE
OUTPATIENT
Start: 2025-08-05 | End: 2025-08-05

## 2025-08-05 RX ORDER — ACETAMINOPHEN 325 MG/1
975 TABLET ORAL ONCE
OUTPATIENT
Start: 2025-08-05 | End: 2025-08-05

## 2025-08-05 RX ORDER — ONDANSETRON 4 MG/1
4 TABLET, ORALLY DISINTEGRATING ORAL ONCE
OUTPATIENT
Start: 2025-08-05 | End: 2025-08-05

## 2025-08-06 ENCOUNTER — DOCUMENTATION ONLY (OUTPATIENT)
Dept: CARE COORDINATION | Facility: CLINIC | Age: 40
End: 2025-08-06
Payer: COMMERCIAL

## 2025-08-06 RX ORDER — MISOPROSTOL 200 UG/1
TABLET ORAL
Qty: 2 TABLET | Refills: 0 | Status: SHIPPED | OUTPATIENT
Start: 2025-08-06

## 2025-08-06 RX ORDER — CHLORHEXIDINE GLUCONATE 40 MG/ML
SOLUTION TOPICAL
Qty: 118 ML | Refills: 0 | Status: SHIPPED | OUTPATIENT
Start: 2025-08-06

## 2025-08-15 ENCOUNTER — HOSPITAL ENCOUNTER (OUTPATIENT)
Facility: CLINIC | Age: 40
Discharge: HOME | End: 2025-08-15
Attending: OBSTETRICS & GYNECOLOGY | Admitting: OBSTETRICS & GYNECOLOGY
Payer: COMMERCIAL

## 2025-08-15 VITALS
OXYGEN SATURATION: 99 % | HEIGHT: 65 IN | BODY MASS INDEX: 39.96 KG/M2 | WEIGHT: 239.86 LBS | DIASTOLIC BLOOD PRESSURE: 62 MMHG | RESPIRATION RATE: 18 BRPM | SYSTOLIC BLOOD PRESSURE: 114 MMHG | HEART RATE: 90 BPM | TEMPERATURE: 98.2 F

## 2025-08-15 DIAGNOSIS — Z33.2 TERMINATION OF PREGNANCY (FETUS): Primary | ICD-10-CM

## 2025-08-15 LAB
ABO + RH BLD: NORMAL
BASOPHILS # BLD AUTO: 0.03 10E3/UL (ref 0–0.2)
BASOPHILS NFR BLD AUTO: 0.5 %
BLD GP AB SCN SERPL QL: NEGATIVE
EOSINOPHIL # BLD AUTO: 0.24 10E3/UL (ref 0–0.7)
EOSINOPHIL NFR BLD AUTO: 4 %
ERYTHROCYTE [DISTWIDTH] IN BLOOD BY AUTOMATED COUNT: 19.7 % (ref 10–15)
HCT VFR BLD AUTO: 29.9 % (ref 35–47)
HGB BLD-MCNC: 9 G/DL (ref 11.7–15.7)
IMM GRANULOCYTES # BLD: <0.03 10E3/UL
IMM GRANULOCYTES NFR BLD: 0.2 %
LYMPHOCYTES # BLD AUTO: 2.01 10E3/UL (ref 0.8–5.3)
LYMPHOCYTES NFR BLD AUTO: 33.4 %
MCH RBC QN AUTO: 24.1 PG (ref 26.5–33)
MCHC RBC AUTO-ENTMCNC: 30.1 G/DL (ref 31.5–36.5)
MCV RBC AUTO: 80.2 FL (ref 78–100)
MONOCYTES # BLD AUTO: 0.43 10E3/UL (ref 0–1.3)
MONOCYTES NFR BLD AUTO: 7.1 %
NEUTROPHILS # BLD AUTO: 3.3 10E3/UL (ref 1.6–8.3)
NEUTROPHILS NFR BLD AUTO: 54.8 %
NRBC # BLD AUTO: <0.03 10E3/UL
NRBC BLD AUTO-RTO: 0 /100
PLATELET # BLD AUTO: 236 10E3/UL (ref 150–450)
RBC # BLD AUTO: 3.73 10E6/UL (ref 3.8–5.2)
SPECIMEN EXP DATE BLD: NORMAL
WBC # BLD AUTO: 6.02 10E3/UL (ref 4–11)

## 2025-08-15 PROCEDURE — 36415 COLL VENOUS BLD VENIPUNCTURE: CPT | Performed by: STUDENT IN AN ORGANIZED HEALTH CARE EDUCATION/TRAINING PROGRAM

## 2025-08-15 PROCEDURE — 86900 BLOOD TYPING SEROLOGIC ABO: CPT | Performed by: STUDENT IN AN ORGANIZED HEALTH CARE EDUCATION/TRAINING PROGRAM

## 2025-08-15 PROCEDURE — 250N000009 HC RX 250: Performed by: OBSTETRICS & GYNECOLOGY

## 2025-08-15 PROCEDURE — 360N000075 HC SURGERY LEVEL 2, PER MIN: Performed by: OBSTETRICS & GYNECOLOGY

## 2025-08-15 PROCEDURE — 370N000017 HC ANESTHESIA TECHNICAL FEE, PER MIN: Performed by: OBSTETRICS & GYNECOLOGY

## 2025-08-15 PROCEDURE — 710N000012 HC RECOVERY PHASE 2, PER MINUTE: Performed by: OBSTETRICS & GYNECOLOGY

## 2025-08-15 PROCEDURE — 85004 AUTOMATED DIFF WBC COUNT: CPT | Performed by: STUDENT IN AN ORGANIZED HEALTH CARE EDUCATION/TRAINING PROGRAM

## 2025-08-15 PROCEDURE — 88300 SURGICAL PATH GROSS: CPT | Mod: 26 | Performed by: PATHOLOGY

## 2025-08-15 PROCEDURE — 999N000141 HC STATISTIC PRE-PROCEDURE NURSING ASSESSMENT: Performed by: OBSTETRICS & GYNECOLOGY

## 2025-08-15 PROCEDURE — 88300 SURGICAL PATH GROSS: CPT | Mod: TC | Performed by: OBSTETRICS & GYNECOLOGY

## 2025-08-15 PROCEDURE — 250N000011 HC RX IP 250 OP 636: Performed by: STUDENT IN AN ORGANIZED HEALTH CARE EDUCATION/TRAINING PROGRAM

## 2025-08-15 PROCEDURE — 250N000013 HC RX MED GY IP 250 OP 250 PS 637: Performed by: STUDENT IN AN ORGANIZED HEALTH CARE EDUCATION/TRAINING PROGRAM

## 2025-08-15 PROCEDURE — 272N000001 HC OR GENERAL SUPPLY STERILE: Performed by: OBSTETRICS & GYNECOLOGY

## 2025-08-15 PROCEDURE — 59840 INDUCED ABORTION D&C: CPT | Mod: GC | Performed by: OBSTETRICS & GYNECOLOGY

## 2025-08-15 PROCEDURE — 76998 US GUIDE INTRAOP: CPT | Mod: 26 | Performed by: OBSTETRICS & GYNECOLOGY

## 2025-08-15 RX ORDER — ACETAMINOPHEN 325 MG/1
975 TABLET ORAL EVERY 6 HOURS PRN
Qty: 50 TABLET | Refills: 0 | Status: SHIPPED | OUTPATIENT
Start: 2025-08-15

## 2025-08-15 RX ORDER — LIDOCAINE 40 MG/G
CREAM TOPICAL
Status: DISCONTINUED | OUTPATIENT
Start: 2025-08-15 | End: 2025-08-15 | Stop reason: HOSPADM

## 2025-08-15 RX ORDER — ONDANSETRON 4 MG/1
4 TABLET, ORALLY DISINTEGRATING ORAL ONCE
Status: COMPLETED | OUTPATIENT
Start: 2025-08-15 | End: 2025-08-15

## 2025-08-15 RX ORDER — ONDANSETRON 4 MG/1
4 TABLET, ORALLY DISINTEGRATING ORAL EVERY 30 MIN PRN
Status: DISCONTINUED | OUTPATIENT
Start: 2025-08-15 | End: 2025-08-15 | Stop reason: HOSPADM

## 2025-08-15 RX ORDER — SODIUM CHLORIDE, SODIUM LACTATE, POTASSIUM CHLORIDE, CALCIUM CHLORIDE 600; 310; 30; 20 MG/100ML; MG/100ML; MG/100ML; MG/100ML
INJECTION, SOLUTION INTRAVENOUS CONTINUOUS
Status: DISCONTINUED | OUTPATIENT
Start: 2025-08-15 | End: 2025-08-15 | Stop reason: HOSPADM

## 2025-08-15 RX ORDER — HYDROMORPHONE HYDROCHLORIDE 1 MG/ML
0.4 INJECTION, SOLUTION INTRAMUSCULAR; INTRAVENOUS; SUBCUTANEOUS EVERY 5 MIN PRN
Status: DISCONTINUED | OUTPATIENT
Start: 2025-08-15 | End: 2025-08-15 | Stop reason: HOSPADM

## 2025-08-15 RX ORDER — OXYCODONE HYDROCHLORIDE 5 MG/1
5 TABLET ORAL
Status: DISCONTINUED | OUTPATIENT
Start: 2025-08-15 | End: 2025-08-15 | Stop reason: HOSPADM

## 2025-08-15 RX ORDER — ACETAMINOPHEN 325 MG/1
975 TABLET ORAL ONCE
Status: DISCONTINUED | OUTPATIENT
Start: 2025-08-15 | End: 2025-08-15 | Stop reason: HOSPADM

## 2025-08-15 RX ORDER — DEXAMETHASONE SODIUM PHOSPHATE 4 MG/ML
4 INJECTION, SOLUTION INTRA-ARTICULAR; INTRALESIONAL; INTRAMUSCULAR; INTRAVENOUS; SOFT TISSUE
Status: DISCONTINUED | OUTPATIENT
Start: 2025-08-15 | End: 2025-08-15 | Stop reason: HOSPADM

## 2025-08-15 RX ORDER — LIDOCAINE HYDROCHLORIDE 10 MG/ML
INJECTION, SOLUTION INFILTRATION; PERINEURAL PRN
Status: DISCONTINUED | OUTPATIENT
Start: 2025-08-15 | End: 2025-08-15 | Stop reason: HOSPADM

## 2025-08-15 RX ORDER — FENTANYL CITRATE 50 UG/ML
25 INJECTION, SOLUTION INTRAMUSCULAR; INTRAVENOUS EVERY 5 MIN PRN
Status: DISCONTINUED | OUTPATIENT
Start: 2025-08-15 | End: 2025-08-15 | Stop reason: HOSPADM

## 2025-08-15 RX ORDER — NALOXONE HYDROCHLORIDE 0.4 MG/ML
0.1 INJECTION, SOLUTION INTRAMUSCULAR; INTRAVENOUS; SUBCUTANEOUS
Status: DISCONTINUED | OUTPATIENT
Start: 2025-08-15 | End: 2025-08-15 | Stop reason: HOSPADM

## 2025-08-15 RX ORDER — OXYCODONE HYDROCHLORIDE 10 MG/1
10 TABLET ORAL
Status: DISCONTINUED | OUTPATIENT
Start: 2025-08-15 | End: 2025-08-15 | Stop reason: HOSPADM

## 2025-08-15 RX ORDER — DOXYCYCLINE 100 MG/1
200 CAPSULE ORAL ONCE
Status: COMPLETED | OUTPATIENT
Start: 2025-08-15 | End: 2025-08-15

## 2025-08-15 RX ORDER — ONDANSETRON 2 MG/ML
4 INJECTION INTRAMUSCULAR; INTRAVENOUS EVERY 30 MIN PRN
Status: DISCONTINUED | OUTPATIENT
Start: 2025-08-15 | End: 2025-08-15 | Stop reason: HOSPADM

## 2025-08-15 RX ORDER — ACETAMINOPHEN 325 MG/1
975 TABLET ORAL ONCE
Status: COMPLETED | OUTPATIENT
Start: 2025-08-15 | End: 2025-08-15

## 2025-08-15 RX ORDER — HYDROMORPHONE HYDROCHLORIDE 1 MG/ML
0.2 INJECTION, SOLUTION INTRAMUSCULAR; INTRAVENOUS; SUBCUTANEOUS EVERY 5 MIN PRN
Status: DISCONTINUED | OUTPATIENT
Start: 2025-08-15 | End: 2025-08-15 | Stop reason: HOSPADM

## 2025-08-15 RX ORDER — ONDANSETRON 2 MG/ML
4 INJECTION INTRAMUSCULAR; INTRAVENOUS ONCE
Status: COMPLETED | OUTPATIENT
Start: 2025-08-15 | End: 2025-08-15

## 2025-08-15 RX ORDER — FENTANYL CITRATE 50 UG/ML
50 INJECTION, SOLUTION INTRAMUSCULAR; INTRAVENOUS EVERY 5 MIN PRN
Status: DISCONTINUED | OUTPATIENT
Start: 2025-08-15 | End: 2025-08-15 | Stop reason: HOSPADM

## 2025-08-15 RX ADMIN — ACETAMINOPHEN 975 MG: 325 TABLET ORAL at 10:43

## 2025-08-15 RX ADMIN — DOXYCYCLINE HYCLATE 200 MG: 100 CAPSULE ORAL at 10:45

## 2025-08-15 RX ADMIN — ONDANSETRON 4 MG: 4 TABLET, ORALLY DISINTEGRATING ORAL at 10:44

## 2025-08-15 ASSESSMENT — ACTIVITIES OF DAILY LIVING (ADL)
ADLS_ACUITY_SCORE: 56

## 2025-08-18 ENCOUNTER — APPOINTMENT (OUTPATIENT)
Dept: OBGYN | Facility: CLINIC | Age: 40
End: 2025-08-18
Attending: OBSTETRICS & GYNECOLOGY
Payer: COMMERCIAL

## 2025-08-20 LAB
PATH REPORT.COMMENTS IMP SPEC: NORMAL
PATH REPORT.COMMENTS IMP SPEC: NORMAL
PATH REPORT.FINAL DX SPEC: NORMAL
PATH REPORT.GROSS SPEC: NORMAL
PATH REPORT.MICROSCOPIC SPEC OTHER STN: NORMAL
PATH REPORT.RELEVANT HX SPEC: NORMAL
PHOTO IMAGE: NORMAL

## 2025-08-26 ENCOUNTER — TELEPHONE (OUTPATIENT)
Dept: OBGYN | Facility: CLINIC | Age: 40
End: 2025-08-26
Payer: COMMERCIAL

## (undated) DEVICE — Device

## (undated) DEVICE — PAD PERI INDIV WRAP 11" 2022A

## (undated) DEVICE — GLOVE BIOGEL PI MICRO INDICATOR UNDERGLOVE SZ 6.5 48965

## (undated) DEVICE — GLOVE PROTEXIS BLUE W/NEU-THERA 7.0  2D73EB70

## (undated) DEVICE — SUCTION VACUUM CANISTER STANDARD W/LID&CAPS 003987-901

## (undated) DEVICE — WIPES FOLEY CARE SURESTEP PROVON DFC100

## (undated) DEVICE — SUCTION MANIFOLD NEPTUNE 2 SYS 4 PORT 0702-020-000

## (undated) DEVICE — APPLICATORS COTTON-TIPPED 3" PKG OF 2 C15050-003

## (undated) DEVICE — PREP DYNA-HEX 4% CHG SCRUB 4OZ BOTTLE MDS098710

## (undated) DEVICE — CATH TRAY FOLEY SURESTEP 16FR WDRAIN BAG STLK LATEX A300316A

## (undated) DEVICE — PAD CHUX UNDERPAD 30X36" P3036C

## (undated) DEVICE — NDL INSUFFLATION 13GA 120MM C2201

## (undated) DEVICE — ENDO TROCAR FIRST ENTRY KII FIOS ADV FIX 05X100MM CFF03

## (undated) DEVICE — SOLIDIFIER (USE FOR UP TO 1500CC) MSOLID1500

## (undated) DEVICE — SUCTION IRR STRYKERFLOW II W/TIP 250-070-520

## (undated) DEVICE — ESU GROUND PAD UNIVERSAL W/O CORD

## (undated) DEVICE — SOL NACL 0.9% IRRIG 1000ML BOTTLE 2F7124

## (undated) DEVICE — DRSG PRIMAPORE 02X3" 7133

## (undated) DEVICE — DECANTER FLUID L3 IN TRANSFER STRL LF DYNJDEC03

## (undated) DEVICE — PANTIES MESH LG/XLG 2PK 706M2

## (undated) DEVICE — STRAP KNEE/BODY 31143004

## (undated) DEVICE — GOWN REINFORCED XXLG 9071

## (undated) DEVICE — SU MONOCRYL 4-0 PS-2 18" UND Y496G

## (undated) DEVICE — DRSG STERI STRIP 1/2X4" R1547

## (undated) DEVICE — LINEN TOWEL PACK X5 5464

## (undated) DEVICE — SOLUTION IRRIGATION 0.9% NACL 1000ML BOTTLE R5200-01

## (undated) DEVICE — ENDO TROCAR FIRST ENTRY KII FIOS ADV FIX 05X150MM CFF01

## (undated) DEVICE — ENDO SCOPE WARMER LF TM500

## (undated) DEVICE — PREP CHLORAPREP 26ML TINTED HI-LITE ORANGE 930815

## (undated) DEVICE — LINEN GOWN X4 5410

## (undated) DEVICE — SOL NACL 0.9% INJ 1000ML BAG 2B1324X

## (undated) DEVICE — STRAP POSITIONING 60X31" BODY KNEE KBS 01

## (undated) DEVICE — ESU PENCIL W/SMOKE EVAC NEPTUNE STRYKER 0703-046-000

## (undated) DEVICE — GLOVE BIOGEL PI ULTRATOUCH G SZ 6.5 42165

## (undated) DEVICE — ESU HOLDER LAP INST DISP PURPLE LONG 330MM H-PRO-330

## (undated) DEVICE — UNDERPAD 36X30 PREMIERPRO MAX ABS NS LF 676111

## (undated) DEVICE — JELLY LUBRICATING SURGILUBE 2OZ TUBE 0281-0205-02

## (undated) DEVICE — LINEN ORTHO PACK 5446

## (undated) DEVICE — LIGHT HANDLE X2

## (undated) DEVICE — DRSG TEGADERM 4X4 3/4" 1626W

## (undated) DEVICE — TUBING SMOKE EVAC PNEUMOCLEAR HIGH FLOW 0620050250

## (undated) DEVICE — SUCTION CANNULA UTERINE 12MM CVD 022112-10

## (undated) DEVICE — SPECIMEN TRAP VACUUM SUCTION SAFETOUCH 003853-902

## (undated) DEVICE — TUBING SUCTION VACUUM COLLECTION 6FTX3/8" 022310

## (undated) RX ORDER — FENTANYL CITRATE-0.9 % NACL/PF 10 MCG/ML
PLASTIC BAG, INJECTION (ML) INTRAVENOUS
Status: DISPENSED
Start: 2024-01-25

## (undated) RX ORDER — ONDANSETRON 2 MG/ML
INJECTION INTRAMUSCULAR; INTRAVENOUS
Status: DISPENSED
Start: 2025-08-15

## (undated) RX ORDER — OXYCODONE HYDROCHLORIDE 5 MG/1
TABLET ORAL
Status: DISPENSED
Start: 2024-01-25

## (undated) RX ORDER — FENTANYL CITRATE 50 UG/ML
INJECTION, SOLUTION INTRAMUSCULAR; INTRAVENOUS
Status: DISPENSED
Start: 2025-08-15

## (undated) RX ORDER — ACETAMINOPHEN 325 MG/1
TABLET ORAL
Status: DISPENSED
Start: 2025-08-15

## (undated) RX ORDER — HYDROXYZINE HYDROCHLORIDE 25 MG/1
TABLET, FILM COATED ORAL
Status: DISPENSED
Start: 2024-01-25

## (undated) RX ORDER — BUPIVACAINE HYDROCHLORIDE 2.5 MG/ML
INJECTION, SOLUTION EPIDURAL; INFILTRATION; INTRACAUDAL
Status: DISPENSED
Start: 2024-01-25

## (undated) RX ORDER — ONDANSETRON 4 MG/1
TABLET, ORALLY DISINTEGRATING ORAL
Status: DISPENSED
Start: 2025-08-15

## (undated) RX ORDER — ONDANSETRON 2 MG/ML
INJECTION INTRAMUSCULAR; INTRAVENOUS
Status: DISPENSED
Start: 2024-01-25

## (undated) RX ORDER — FENTANYL CITRATE 50 UG/ML
INJECTION, SOLUTION INTRAMUSCULAR; INTRAVENOUS
Status: DISPENSED
Start: 2024-01-25

## (undated) RX ORDER — LIDOCAINE HYDROCHLORIDE 10 MG/ML
INJECTION, SOLUTION EPIDURAL; INFILTRATION; INTRACAUDAL; PERINEURAL
Status: DISPENSED
Start: 2025-08-15

## (undated) RX ORDER — DEXAMETHASONE SODIUM PHOSPHATE 4 MG/ML
INJECTION, SOLUTION INTRA-ARTICULAR; INTRALESIONAL; INTRAMUSCULAR; INTRAVENOUS; SOFT TISSUE
Status: DISPENSED
Start: 2024-01-25

## (undated) RX ORDER — PROPOFOL 10 MG/ML
INJECTION, EMULSION INTRAVENOUS
Status: DISPENSED
Start: 2025-08-15

## (undated) RX ORDER — DOXYCYCLINE 100 MG/1
CAPSULE ORAL
Status: DISPENSED
Start: 2025-08-15